# Patient Record
Sex: MALE | Race: WHITE | NOT HISPANIC OR LATINO | Employment: OTHER | URBAN - METROPOLITAN AREA
[De-identification: names, ages, dates, MRNs, and addresses within clinical notes are randomized per-mention and may not be internally consistent; named-entity substitution may affect disease eponyms.]

---

## 2017-01-26 ENCOUNTER — TELEPHONE (OUTPATIENT)
Dept: NEUROLOGY | Facility: CLINIC | Age: 67
End: 2017-01-26

## 2017-01-26 NOTE — TELEPHONE ENCOUNTER
Returned call to pt and he states Azilect $700 a month for 30 days supply. He wants to know if he can have something close to this. He also states the CD/LD/.enta is $300 a month as well. This is a 30 day supply as well. Pt is willing to have some patient assistance as well, if he can.

## 2017-01-26 NOTE — TELEPHONE ENCOUNTER
----- Message from Davida Trevizo sent at 1/24/2017  8:35 AM CST -----  Contact: pt [phone]  Pt is calling to speak with nurse regarding changing his medication.pls call

## 2017-01-31 ENCOUNTER — TELEPHONE (OUTPATIENT)
Dept: PHARMACY | Facility: CLINIC | Age: 67
End: 2017-01-31

## 2017-03-22 ENCOUNTER — OFFICE VISIT (OUTPATIENT)
Dept: NEUROLOGY | Facility: CLINIC | Age: 67
End: 2017-03-22
Payer: COMMERCIAL

## 2017-03-22 VITALS
HEIGHT: 72 IN | HEART RATE: 86 BPM | DIASTOLIC BLOOD PRESSURE: 70 MMHG | BODY MASS INDEX: 34.84 KG/M2 | SYSTOLIC BLOOD PRESSURE: 119 MMHG | WEIGHT: 257.25 LBS

## 2017-03-22 DIAGNOSIS — G20.A1 PARKINSON'S DISEASE: ICD-10-CM

## 2017-03-22 PROCEDURE — 99213 OFFICE O/P EST LOW 20 MIN: CPT | Mod: S$GLB,,, | Performed by: PSYCHIATRY & NEUROLOGY

## 2017-03-22 PROCEDURE — 99999 PR PBB SHADOW E&M-EST. PATIENT-LVL III: CPT | Mod: PBBFAC,,, | Performed by: PSYCHIATRY & NEUROLOGY

## 2017-03-22 PROCEDURE — 95970 ALYS NPGT W/O PRGRMG: CPT | Mod: S$GLB,,, | Performed by: PSYCHIATRY & NEUROLOGY

## 2017-03-22 PROCEDURE — 1160F RVW MEDS BY RX/DR IN RCRD: CPT | Mod: S$GLB,,, | Performed by: PSYCHIATRY & NEUROLOGY

## 2017-03-22 NOTE — PROGRESS NOTES
Name: Modesto Mariscal  MRN: [id number]   CSN: [id number]      Date: 03/22/2017    Chief Complaint: tremor and toes curling in evenings  - tremor is under good control in his arms B  - azilect cost jumped after went generic  As plan stopped paying for brand  - stalevo also not paid  - L arm tremor still comes out when stressed or tired  - no wearing off if takes the meds on time, unless late evening stretch for longer than 6 hours    HPI: 67 yo RH male with PD, S/P B DBS, last seen in office for programming 8-9-16. He is accompanied today by his wife. Notes tremor BH- LH>RH. He is aware of tremor daily. He leonela last program without difficulty. Wife states that toes are curling up. Began one to two months ago. Occurs when standing all day and then goes to take shoes off.  Typically doses [id number]    Meds:   Current Outpatient Prescriptions on File Prior to Visit   Medication Sig Dispense Refill    amantadine HCl (SYMMETREL) 100 mg capsule Take 100 mg by mouth 2 (two) times daily.      carbidopa-levodopa-entacapone 37.5-150-200 mg (STALEVO) 37.5-150-200 mg Tab Take 1 tablet by mouth 3 (three) times daily.      gabapentin (NEURONTIN) 100 MG capsule Take 100 mg by mouth 3 (three) times daily.      glipiZIDE (GLUCOTROL) 10 MG TR24 Take 10 mg by mouth daily with breakfast.      metformin (GLUCOPHAGE) 1000 MG tablet Take 1,000 mg by mouth daily with breakfast.       ofloxacin (OCUFLOX) 0.3 % ophthalmic solution   1    omeprazole (PRILOSEC) 20 MG capsule Take 20 mg by mouth once daily.      sitagliptin (JANUVIA) 100 MG Tab Take 100 mg by mouth once daily.      valsartan-hydrochlorothiazide (DIOVAN-HCT) 320-12.5 mg per tablet Take 1 tablet by mouth once daily.      AZILECT 1 mg Tab TAKE 1 TABLET (1 MG TOTAL) BY MOUTH ONCE DAILY. 30 tablet 11    gabapentin (NEURONTIN) 100 MG capsule TAKE 1 CAPSULE BY MOUTH 3 (THREE) TIMES DAILY.      ketoconazole (NIZORAL) 2 % cream APPLY TOPICALLY TO FACE QD AS NEEDED       prednisoLONE acetate (PRED FORTE) 1 % DrpS   1     No current facility-administered medications on file prior to visit.      Vitals:    03/22/17 1030   BP: 119/70   Pulse: 86     Time/doses of last PD meds taken: last dose at 7 am today    Initial DBS settings:     L STN  Volts  PW  Freq  Imp  Curr  Quinn  Exam/Notes   C+2- 2.0 90 180 1144 1.760 2.94 R hand tremor gone     R STN  Volts  PW  Freq  Imp  Curr  Quinn  Exam/Notes   11+10-9-8- 3.5 90 180 864 4.001  Minimal L hand tremor with distraction or as turning the DBS off/on.     Turned up the R STN to 3.6.  Well -tolerated, tremor suppressed.    Diagnoses:          PD    Plan:  1) Continue generic cd/ld and comtan and amantadine, no azilect 2/2 $$$.  2)     I spent 25 minutes face-to-face with the patient for DBS programming today.    Brad Goff MD, MPH  Division of Movement and Memory Disorders  [facility]

## 2017-03-22 NOTE — LETTER
March 28, 2017      Cuong Mock III, MD  [address] [facility] [place] [address]           [facility]  [address]  [facility] [address]  Phone: [phone]  Fax: [phone]          Patient: Modesto Mariscal   MR Number: [id number]   YOB: 1950   Date of Visit: 3/22/2017       Dear Dr. Cuong Mock III:    Thank you for referring Modesto Mariscal to me for evaluation. Attached you will find relevant portions of my assessment and plan of care.    If you have questions, please do not hesitate to call me. I look forward to following Modesto Mariscal along with you.    Sincerely,    Brad Goff MD    Enclosure  CC:  No Recipients    If you would like to receive this communication electronically, please contact [email or web address] or [phone] to request more information on [employer] Link access.    For providers and/or their staff who would like to refer a patient to [facility], please contact us through our one-stop-shop provider referral line, [facility], at [phone].    If you feel you have received this communication in error or would no longer like to receive these types of communications, please e-mail [email or web address]

## 2017-11-10 ENCOUNTER — OFFICE VISIT (OUTPATIENT)
Dept: NEUROLOGY | Facility: CLINIC | Age: 67
End: 2017-11-10
Payer: MEDICARE

## 2017-11-10 VITALS
WEIGHT: 248.25 LBS | HEIGHT: 72 IN | HEART RATE: 89 BPM | BODY MASS INDEX: 33.62 KG/M2 | DIASTOLIC BLOOD PRESSURE: 75 MMHG | SYSTOLIC BLOOD PRESSURE: 125 MMHG

## 2017-11-10 DIAGNOSIS — G20.A1 PARKINSON'S DISEASE: ICD-10-CM

## 2017-11-10 PROCEDURE — 99999 PR PBB SHADOW E&M-EST. PATIENT-LVL III: CPT | Mod: PBBFAC,,, | Performed by: PSYCHIATRY & NEUROLOGY

## 2017-11-10 PROCEDURE — 99213 OFFICE O/P EST LOW 20 MIN: CPT | Mod: PBBFAC,25 | Performed by: PSYCHIATRY & NEUROLOGY

## 2017-11-10 PROCEDURE — 95978 PR ANALYZE NEUROSTIM BRAIN, FIRST 1H: CPT | Mod: PBBFAC | Performed by: PSYCHIATRY & NEUROLOGY

## 2017-11-10 PROCEDURE — 99499 UNLISTED E&M SERVICE: CPT | Mod: S$PBB,,, | Performed by: PSYCHIATRY & NEUROLOGY

## 2017-11-10 PROCEDURE — 95978 PR ANALYZE NEUROSTIM BRAIN, FIRST 1H: CPT | Mod: S$PBB,,, | Performed by: PSYCHIATRY & NEUROLOGY

## 2017-11-10 RX ORDER — ENTACAPONE 200 MG/1
200 TABLET ORAL 3 TIMES DAILY
COMMUNITY
End: 2018-12-11 | Stop reason: SDUPTHER

## 2017-11-10 NOTE — LETTER
November 13, 2017      Cuong Mock III, MD  [address] [facility] [place] [address]           [facility]  [address]  [facility] [address]  Phone: [phone]  Fax: [phone]          Patient: Modesto Mariscal   MR Number: [id number]   YOB: 1950   Date of Visit: 11/10/2017       Dear Dr. Cuong Mock III:    Thank you for referring Modesto Mariscal to me for evaluation. Attached you will find relevant portions of my assessment and plan of care.    If you have questions, please do not hesitate to call me. I look forward to following Modesto Mariscal along with you.    Sincerely,    Erna Sherman  CC:  No Recipients    If you would like to receive this communication electronically, please contact [email or web address] or [phone] to request more information on [employer] Link access.    For providers and/or their staff who would like to refer a patient to [facility], please contact us through our one-stop-shop provider referral line, [facility], at [phone].    If you feel you have received this communication in error or would no longer like to receive these types of communications, please e-mail [email or web address]

## 2017-11-10 NOTE — PROGRESS NOTES
Name: Modesto Mariscal  MRN: [id number]   CSN: [id number]      Date: 11/10/2017    Chief Complaint:   - more persistent L hand tremor, R gets worse at other times  - some L toe curling under  - worse when is nervous or anxious, better if lying flat  - some imbalance, no falls (but once a month has a scare)      From March 2017:  - tremor is under good control in his arms B  - azilect cost jumped after went generic  As plan stopped paying for brand  - stalevo also not paid  - L arm tremor still comes out when stressed or tired  - no wearing off if takes the meds on time, unless late evening stretch for longer than 6 hours    HPI: 65 yo RH male with PD, S/P B DBS, last seen in office for programming 8-9-16. He is accompanied today by his wife. Notes tremor BH- LH>RH. He is aware of tremor daily. He leonela last program without difficulty. Wife states that toes are curling up. Began one to two months ago. Occurs when standing all day and then goes to take shoes off.  Typically doses [id number]    Meds:   Current Outpatient Prescriptions on File Prior to Visit   Medication Sig Dispense Refill    amantadine HCl (SYMMETREL) 100 mg capsule Take 100 mg by mouth 2 (two) times daily.      carbidopa-levodopa-entacapone 37.5-150-200 mg (STALEVO) 37.5-150-200 mg Tab Take 1 tablet by mouth 3 (three) times daily.      gabapentin (NEURONTIN) 100 MG capsule Take 100 mg by mouth 3 (three) times daily.      gabapentin (NEURONTIN) 100 MG capsule TAKE 1 CAPSULE BY MOUTH 3 (THREE) TIMES DAILY.      glipiZIDE (GLUCOTROL) 10 MG TR24 Take 10 mg by mouth daily with breakfast.      metformin (GLUCOPHAGE) 1000 MG tablet Take 1,000 mg by mouth daily with breakfast.       omeprazole (PRILOSEC) 20 MG capsule Take 20 mg by mouth once daily.      sitagliptin (JANUVIA) 100 MG Tab Take 100 mg by mouth once daily.      valsartan-hydrochlorothiazide (DIOVAN-HCT) 320-12.5 mg per tablet Take 1 tablet by mouth once daily.      AZILECT 1 mg Tab  TAKE 1 TABLET (1 MG TOTAL) BY MOUTH ONCE DAILY. 30 tablet 11    ketoconazole (NIZORAL) 2 % cream APPLY TOPICALLY TO FACE QD AS NEEDED      ofloxacin (OCUFLOX) 0.3 % ophthalmic solution   1    prednisoLONE acetate (PRED FORTE) 1 % DrpS   1     No current facility-administered medications on file prior to visit.      Vitals:    11/10/17 1335   BP: 125/75   Pulse: 89     Time/doses of last PD meds taken: last dose at 7 am today    Initial DBS settings:     L STN  Volts  PW  Freq  Imp  Curr  Quinn  Exam/Notes   C+2- 2.4 90 180 1044 2.299 2.90 R hand tremor gone     R STN  Volts  PW  Freq  Imp  Curr  Quinn  Exam/Notes   11+10-9-8- 3.8 90 180 900 4.173  Minimal L hand tremor with distraction or as turning the DBS off/on.     Guided therapy:  L STN  Volts  PW  Freq  Imp  Curr  Quinn  Exam/Notes   C+2- 2.6 90 180   2.90 R hand tremor gone     R STN  Volts  PW  Freq  Imp  Curr  Quinn  Exam/Notes   C+10- 0.5 90 180    50% improvement    1.0 90 180    50% improvement    1.5 90 180    75% improvement    2.0 90 180    Pulling of l face, still shaking             C+9-10- 1.0 90 180    Tremor about 50%          Pulling on L    11-10-9-8+ Up to 3.5   982 3.529  75% better    4.0 90 180 961 4.130                4.0 90 180 958 4.087               C+10-11- 2.0 60 180 663 2.963                                     Final (setting C):    L STN  Volts  PW  Freq  Imp  Curr  Quinn  Exam/Notes   C+2- 2.6 90 180 1044 2.457 2.90 R hand tremor gone     R STN  Volts  PW  Freq  Imp  Curr  Quinn  Exam/Notes   C+10-11 1.0 60 180 667 1.455  75% improvement   C+9-8- 2.0 60 180 711 2.751  75% improvement   * interleaving      Diagnoses:          PD    Plan:  I spent 45 minutes face-to-face with the patient for DBS programming today.    Set A, B, C settings for his ability to toggle between.    Brad Goff MD, MPH  Division of Movement and Memory Disorders  [facility]

## 2018-02-19 ENCOUNTER — OFFICE VISIT (OUTPATIENT)
Dept: NEUROLOGY | Facility: CLINIC | Age: 68
End: 2018-02-19
Payer: MEDICARE

## 2018-02-19 VITALS
SYSTOLIC BLOOD PRESSURE: 142 MMHG | BODY MASS INDEX: 33.41 KG/M2 | DIASTOLIC BLOOD PRESSURE: 72 MMHG | WEIGHT: 246.69 LBS | HEART RATE: 90 BPM | HEIGHT: 72 IN

## 2018-02-19 DIAGNOSIS — G20.A1 PARKINSON DISEASE: ICD-10-CM

## 2018-02-19 DIAGNOSIS — Z46.2 ENCOUNTER FOR INTERROGATION OF NEUROSTIMULATOR: Primary | ICD-10-CM

## 2018-02-19 PROCEDURE — 99999 PR PBB SHADOW E&M-EST. PATIENT-LVL III: CPT | Mod: PBBFAC,,, | Performed by: PSYCHIATRY & NEUROLOGY

## 2018-02-19 PROCEDURE — 99499 UNLISTED E&M SERVICE: CPT | Mod: S$PBB,,, | Performed by: PSYCHIATRY & NEUROLOGY

## 2018-02-19 PROCEDURE — 99213 OFFICE O/P EST LOW 20 MIN: CPT | Mod: PBBFAC | Performed by: PSYCHIATRY & NEUROLOGY

## 2018-02-19 PROCEDURE — 95978 PR ANALYZE NEUROSTIM BRAIN, FIRST 1H: CPT | Mod: PBBFAC | Performed by: PSYCHIATRY & NEUROLOGY

## 2018-02-19 PROCEDURE — 95978 PR ANALYZE NEUROSTIM BRAIN, FIRST 1H: CPT | Mod: S$PBB,,, | Performed by: PSYCHIATRY & NEUROLOGY

## 2018-02-19 RX ORDER — APIXABAN 5 MG/1
5 TABLET, FILM COATED ORAL 2 TIMES DAILY
COMMUNITY
Start: 2018-02-13

## 2018-02-19 NOTE — PROGRESS NOTES
Name: Modesto Mariscal  MRN: [id number]   CSN: [id number]      Date: 02/19/2018    Chief Complaint:   - good DBS programming last time FOR ABOUT A WEEK  - but has been moving around less and prothrombin gene mutation --> Had DVT in R leg and now on eliquis  - no falls, but some stutter steps  - no issues with cognition  -  COMES IN AND HAS BEEN OFF SINCE November 19TH!     From Nov 2017:  - more persistent L hand tremor, R gets worse at other times  - some L toe curling under  - worse when is nervous or anxious, better if lying flat  - some imbalance, no falls (but once a month has a scare)    From March 2017:  - tremor is under good control in his arms B  - azilect cost jumped after went generic  As plan stopped paying for brand  - stalevo also not paid  - L arm tremor still comes out when stressed or tired  - no wearing off if takes the meds on time, unless late evening stretch for longer than 6 hours    HPI: 67 yo RH male with PD, S/P B DBS, last seen in office for programming 8-9-16. He is accompanied today by his wife. Notes tremor BH- LH>RH. He is aware of tremor daily. He leonela last program without difficulty. Wife states that toes are curling up. Began one to two months ago. Occurs when standing all day and then goes to take shoes off.  Typically doses [id number]    Meds:   Current Outpatient Prescriptions on File Prior to Visit   Medication Sig Dispense Refill    amantadine HCl (SYMMETREL) 100 mg capsule Take 100 mg by mouth 2 (two) times daily.      AZILECT 1 mg Tab TAKE 1 TABLET (1 MG TOTAL) BY MOUTH ONCE DAILY. 30 tablet 11    carbidopa-levodopa-entacapone 37.5-150-200 mg (STALEVO) 37.5-150-200 mg Tab Take 1 tablet by mouth 3 (three) times daily.      entacapone (COMTAN) 200 mg Tab Take 200 mg by mouth 3 (three) times daily.      gabapentin (NEURONTIN) 100 MG capsule Take 100 mg by mouth 3 (three) times daily.      gabapentin (NEURONTIN) 100 MG capsule TAKE 1 CAPSULE BY MOUTH 3 (THREE) TIMES  DAILY.      glipiZIDE (GLUCOTROL) 10 MG TR24 Take 10 mg by mouth daily with breakfast.      ketoconazole (NIZORAL) 2 % cream APPLY TOPICALLY TO FACE QD AS NEEDED      metformin (GLUCOPHAGE) 1000 MG tablet Take 1,000 mg by mouth daily with breakfast.       omeprazole (PRILOSEC) 20 MG capsule Take 20 mg by mouth once daily.      sitagliptin (JANUVIA) 100 MG Tab Take 100 mg by mouth once daily.      valsartan-hydrochlorothiazide (DIOVAN-HCT) 320-12.5 mg per tablet Take 1 tablet by mouth once daily.      ofloxacin (OCUFLOX) 0.3 % ophthalmic solution   1    prednisoLONE acetate (PRED FORTE) 1 % DrpS   1     No current facility-administered medications on file prior to visit.      Vitals:    02/19/18 1535   BP: (!) 142/72   Pulse: 90     Time/doses of last PD meds taken: last dose at 7 am today    Initial DBS settings:     Starting setting - was OFF when he came to clinic:    L STN  Volts  PW  Freq  Imp  Curr  Quinn  Exam/Notes   C+2- 2.6 90 120 1044 2.457 2.90 R hand tremor gone     R STN  Volts  PW  Freq  Imp  Curr  Quinn  Exam/Notes   C+10-11 1.0 60 120 667 1.455  75% improvement   C+9-8- 2.0 60 120 711 2.751  75% improvement   * interleaving    Changed multiple settings up and down the voltage range  R STN  Volts  PW  Freq  Imp  Curr  Quinn  Exam/Notes   11+10-9-8- 0.8 60 180 706 1.123  Mild-moderate L hand tremor        Diagnoses:          PD    Plan:  - stim  - meds as he is  - exercise    Brad Goff MD, MPH  Division of Movement and Memory Disorders  [facility]

## 2018-04-09 ENCOUNTER — TELEPHONE (OUTPATIENT)
Dept: NEUROLOGY | Facility: CLINIC | Age: 68
End: 2018-04-09

## 2018-04-17 ENCOUNTER — OFFICE VISIT (OUTPATIENT)
Dept: NEUROLOGY | Facility: CLINIC | Age: 68
End: 2018-04-17
Payer: MEDICARE

## 2018-04-17 VITALS
HEART RATE: 81 BPM | WEIGHT: 237.19 LBS | SYSTOLIC BLOOD PRESSURE: 113 MMHG | HEIGHT: 72 IN | DIASTOLIC BLOOD PRESSURE: 77 MMHG | BODY MASS INDEX: 32.13 KG/M2

## 2018-04-17 DIAGNOSIS — Z46.2 ENCOUNTER FOR INTERROGATION OF NEUROSTIMULATOR: ICD-10-CM

## 2018-04-17 DIAGNOSIS — G20.A1 PD (PARKINSON'S DISEASE): Primary | ICD-10-CM

## 2018-04-17 PROCEDURE — 99213 OFFICE O/P EST LOW 20 MIN: CPT | Mod: PBBFAC,25 | Performed by: PSYCHIATRY & NEUROLOGY

## 2018-04-17 PROCEDURE — 99999 PR PBB SHADOW E&M-EST. PATIENT-LVL III: CPT | Mod: PBBFAC,,, | Performed by: PSYCHIATRY & NEUROLOGY

## 2018-04-17 PROCEDURE — 99499 UNLISTED E&M SERVICE: CPT | Mod: S$PBB,,, | Performed by: PSYCHIATRY & NEUROLOGY

## 2018-04-17 PROCEDURE — 95978 PR ANALYZE NEUROSTIM BRAIN, FIRST 1H: CPT | Mod: S$PBB,,, | Performed by: PSYCHIATRY & NEUROLOGY

## 2018-04-17 PROCEDURE — 95978 PR ANALYZE NEUROSTIM BRAIN, FIRST 1H: CPT | Mod: PBBFAC | Performed by: PSYCHIATRY & NEUROLOGY

## 2018-04-17 RX ORDER — CLOTRIMAZOLE AND BETAMETHASONE DIPROPIONATE 10; .64 MG/G; MG/G
CREAM TOPICAL
Refills: 2 | COMMUNITY
Start: 2018-03-29 | End: 2019-07-08

## 2018-04-17 RX ORDER — AMANTADINE HYDROCHLORIDE 100 MG/1
100 CAPSULE, GELATIN COATED ORAL
COMMUNITY
Start: 2018-03-12 | End: 2018-12-11 | Stop reason: SDUPTHER

## 2018-04-17 NOTE — PROGRESS NOTES
Name: Modesto Mariscal  MRN: [id number]   CSN: [id number]      Date: 04/17/2018    Chief Complaint:  - had 2 falls on uneven ground, chasing the dog  - balance is generally worse  - tremor on the left remains about the same  - no swallow issues, but speech remains an issue - perhaps slurring more now  - bowel and bladder stable except one episode of incontinence after last appt (and ate chinese food)  - he thinks memory and mood    From the wife and family:  - memory is worse for short term, word finding, misplacing things (usually turn up)  - lost the dbs   - mood is more ornery (maybe mostly with his wife)      From 2/19/18:   - good DBS programming last time FOR ABOUT A WEEK  - but has been moving around less and prothrombin gene mutation --> Had DVT in R leg and now on eliquis  - no falls, but some stutter steps  - no issues with cognition  -  COMES IN AND HAS BEEN OFF SINCE November 19TH!     From Nov 2017:  - more persistent L hand tremor, R gets worse at other times  - some L toe curling under  - worse when is nervous or anxious, better if lying flat  - some imbalance, no falls (but once a month has a scare)    From March 2017:  - tremor is under good control in his arms B  - azilect cost jumped after went generic  As plan stopped paying for brand  - stalevo also not paid  - L arm tremor still comes out when stressed or tired  - no wearing off if takes the meds on time, unless late evening stretch for longer than 6 hours    HPI: 67 yo RH male with PD, S/P B DBS, last seen in office for programming 8-9-16. He is accompanied today by his wife. Notes tremor BH- LH>RH. He is aware of tremor daily. He leonela last program without difficulty. Wife states that toes are curling up. Began one to two months ago. Occurs when standing all day and then goes to take shoes off.  Typically doses [id number]    Meds:   Current Outpatient Prescriptions on File Prior to Visit   Medication Sig Dispense Refill     amantadine HCl (SYMMETREL) 100 mg capsule Take 100 mg by mouth 2 (two) times daily.      AZILECT 1 mg Tab TAKE 1 TABLET (1 MG TOTAL) BY MOUTH ONCE DAILY. 30 tablet 11    carbidopa-levodopa-entacapone 37.5-150-200 mg (STALEVO) 37.5-150-200 mg Tab Take 1 tablet by mouth 3 (three) times daily.      ELIQUIS 5 mg Tab       entacapone (COMTAN) 200 mg Tab Take 200 mg by mouth 3 (three) times daily.      gabapentin (NEURONTIN) 100 MG capsule Take 100 mg by mouth 3 (three) times daily.      gabapentin (NEURONTIN) 100 MG capsule TAKE 1 CAPSULE BY MOUTH 3 (THREE) TIMES DAILY.      ketoconazole (NIZORAL) 2 % cream APPLY TOPICALLY TO FACE QD AS NEEDED      metformin (GLUCOPHAGE) 1000 MG tablet Take 1,000 mg by mouth daily with breakfast.       omeprazole (PRILOSEC) 20 MG capsule Take 20 mg by mouth once daily.      sitagliptin (JANUVIA) 100 MG Tab Take 100 mg by mouth once daily.      valsartan-hydrochlorothiazide (DIOVAN-HCT) 320-12.5 mg per tablet Take 1 tablet by mouth once daily.      glipiZIDE (GLUCOTROL) 10 MG TR24 Take 10 mg by mouth daily with breakfast.      ofloxacin (OCUFLOX) 0.3 % ophthalmic solution   1    prednisoLONE acetate (PRED FORTE) 1 % DrpS   1     No current facility-administered medications on file prior to visit.      Vitals:    04/17/18 1356   BP: 113/77   Pulse: 81     Initial DBS settings:     L STN  Volts  PW  Freq  Imp  Curr  Quinn  Exam/Notes   C+2- 2.6 90 180   2.90 R hand tremor gone     R STN  Volts  PW  Freq  Imp  Curr  Quinn  Exam/Notes   C+11-10-9- 0.8 60 180    Mild-moderate L hand tremor      Changed settings:    R STN  Volts  PW  Freq  Imp  Curr  Quinn  Exam/Notes   C+11-10- 0.8 60 180    Left off the 9 contact    Up to 2.0 60 180    Puckering mouth at 2.0              11-10-9+ Up to 3.0 90 180    Puckering mouth, then gone  Set to B             11-10-9-8+ 3.0 90 180    Set to A                                                                                                                    Diagnoses:          PD    Plan:  - stim, complicated  - meds without change  - exercise    Brad Goff MD, MPH  Division of Movement and Memory Disorders  [facility]

## 2018-07-23 ENCOUNTER — TELEPHONE (OUTPATIENT)
Dept: NEUROLOGY | Facility: CLINIC | Age: 68
End: 2018-07-23

## 2018-07-23 NOTE — TELEPHONE ENCOUNTER
----- Message from Leoncio Saba sent at 7/23/2018 12:02 PM CDT -----  Needs Advice    Reason for call: Marilou is calling to schedule DBS programming. Last seen 04/17/18.   Communication Preference: Marilou (daughter) @ [phone]  Additional Information:

## 2018-07-23 NOTE — TELEPHONE ENCOUNTER
----- Message from Bertha Astudillo sent at 7/23/2018  5:34 PM CDT -----  Contact: Pt's daughter(Marilou)   Marilou is returning a call, that the pt just missed.    Marilou can be reached at [phone].    Thank you

## 2018-08-07 ENCOUNTER — OFFICE VISIT (OUTPATIENT)
Dept: NEUROLOGY | Facility: CLINIC | Age: 68
End: 2018-08-07
Payer: MEDICARE

## 2018-08-07 VITALS
HEIGHT: 72 IN | BODY MASS INDEX: 32.25 KG/M2 | SYSTOLIC BLOOD PRESSURE: 130 MMHG | HEART RATE: 85 BPM | DIASTOLIC BLOOD PRESSURE: 76 MMHG | WEIGHT: 238.13 LBS

## 2018-08-07 DIAGNOSIS — Z46.2 ENCOUNTER FOR INTERROGATION OF NEUROSTIMULATOR: ICD-10-CM

## 2018-08-07 DIAGNOSIS — G20.A1 PARKINSON'S DISEASE: Primary | ICD-10-CM

## 2018-08-07 DIAGNOSIS — R06.83 SNORING: ICD-10-CM

## 2018-08-07 DIAGNOSIS — R26.9 GAIT DISTURBANCE: ICD-10-CM

## 2018-08-07 PROCEDURE — 95979 PR ANALYZ NEUROSTIM BRAIN, EACH ADD 30 MIN: CPT | Mod: S$PBB,,, | Performed by: NURSE PRACTITIONER

## 2018-08-07 PROCEDURE — 99213 OFFICE O/P EST LOW 20 MIN: CPT | Mod: PBBFAC,25 | Performed by: NURSE PRACTITIONER

## 2018-08-07 PROCEDURE — 95979 PR ANALYZ NEUROSTIM BRAIN, EACH ADD 30 MIN: CPT | Mod: PBBFAC | Performed by: NURSE PRACTITIONER

## 2018-08-07 PROCEDURE — 95978 PR ANALYZE NEUROSTIM BRAIN, FIRST 1H: CPT | Mod: PBBFAC | Performed by: NURSE PRACTITIONER

## 2018-08-07 PROCEDURE — 99999 PR PBB SHADOW E&M-EST. PATIENT-LVL III: CPT | Mod: PBBFAC,,, | Performed by: NURSE PRACTITIONER

## 2018-08-07 PROCEDURE — 99499 UNLISTED E&M SERVICE: CPT | Mod: S$PBB,,, | Performed by: NURSE PRACTITIONER

## 2018-08-07 PROCEDURE — 95978 PR ANALYZE NEUROSTIM BRAIN, FIRST 1H: CPT | Mod: S$PBB,,, | Performed by: NURSE PRACTITIONER

## 2018-08-07 RX ORDER — GABAPENTIN 300 MG/1
CAPSULE ORAL
Refills: 5 | COMMUNITY
Start: 2018-07-24 | End: 2019-07-08 | Stop reason: SDUPTHER

## 2018-08-07 NOTE — LETTER
August 7, 2018      Brad Goff MD  [address]  [facility] [address]           [facility]  [address]  [facility] [address]  Phone: [phone]  Fax: [phone]          Patient: Modesto Mariscal   MR Number: [id number]   YOB: 1950   Date of Visit: 8/7/2018       Dear Dr. Brad Goff:    Thank you for referring Modesto Mariscal to me for evaluation. Attached you will find relevant portions of my assessment and plan of care.    If you have questions, please do not hesitate to call me. I look forward to following Modesto Mariscal along with you.    Sincerely,    Beba Arellano NP    Enclosure  CC:  No Recipients    If you would like to receive this communication electronically, please contact [email or web address] or [phone] to request more information on [employer] Link access.    For providers and/or their staff who would like to refer a patient to [facility], please contact us through our one-stop-shop provider referral line, [facility] , at [phone].    If you feel you have received this communication in error or would no longer like to receive these types of communications, please e-mail [email or web address]

## 2018-08-07 NOTE — PATIENT INSTRUCTIONS
Recommend physical therapy to help with walking.     If you decide you want to have sleep study, let us know. We are happy to refer you.     I think your carbidopa- levodopa- entacapone doses are too far apart from 6:30 to 1:30. I recommend that you move the 1:30 dose up to noon to see if this helps tremor, stiffness, and slowness.   Leave all other doses the same.

## 2018-08-07 NOTE — PROGRESS NOTES
Name: Modesto Mariscal  MRN: [id number]   CSN: [id number]      Date: 08/07/2018    Chief Complaint: PD- DBS    HPI: 69 yo male eith PD, S/P DBS, last seen in office 4-17-18 by Dr. Irwin.   Has fallen a fe time since that time. Hetrips over things which causes fall. Hit head about 6 wweeks, no LOC.   He does not have a cane or walker. He says he would not use an assistive device.     Describes tremor LUE- will very occ note in the RUE. Tremor gets worse when he is tired.   Describes stiffness BLE.   Feels slow most of the time.   +shuffles some- son notes shuffle worse when he gets tired  +FOG nearly every time he turns around        Found his DBS personal  and ret'd loaner that [facility] gave him.    Son concerned he has sleep apnea.     Meds:   Current Outpatient Prescriptions on File Prior to Visit   Medication Sig Dispense Refill    amantadine HCl (SYMMETREL) 100 mg capsule Take 100 mg by mouth 2 (two) times daily.      amantadine HCl (SYMMETREL) 100 mg capsule Take 100 mg by mouth.      AZILECT 1 mg Tab TAKE 1 TABLET (1 MG TOTAL) BY MOUTH ONCE DAILY. 30 tablet 11    carbidopa-levodopa-entacapone 37.5-150-200 mg (STALEVO) 37.5-150-200 mg Tab Take 1 tablet by mouth 3 (three) times daily.      ELIQUIS 5 mg Tab       entacapone (COMTAN) 200 mg Tab Take 200 mg by mouth 3 (three) times daily.      glipiZIDE (GLUCOTROL) 10 MG TR24 Take 10 mg by mouth daily with breakfast.      metformin (GLUCOPHAGE) 1000 MG tablet Take 1,000 mg by mouth daily with breakfast.       omeprazole (PRILOSEC) 20 MG capsule Take 20 mg by mouth once daily.      sitagliptin (JANUVIA) 100 MG Tab Take 100 mg by mouth once daily.      valsartan-hydrochlorothiazide (DIOVAN-HCT) 320-12.5 mg per tablet Take 1 tablet by mouth once daily.      clotrimazole-betamethasone 1-0.05% (LOTRISONE) cream   2    gabapentin (NEURONTIN) 100 MG capsule Take 100 mg by mouth 3 (three) times daily.      gabapentin (NEURONTIN) 100 MG capsule TAKE  1 CAPSULE BY MOUTH 3 (THREE) TIMES DAILY.      ketoconazole (NIZORAL) 2 % cream APPLY TOPICALLY TO FACE QD AS NEEDED      ofloxacin (OCUFLOX) 0.3 % ophthalmic solution   1    prednisoLONE acetate (PRED FORTE) 1 % DrpS   1     No current facility-administered medications on file prior to visit.      Mild hypophonia with masked face, lips parted intermittently.   Near mild tremor RLE at rest on diversion only.  Mild and persistent rest tremor LH but near moderate tremor on diversion LH.  Very subtle postural tremor BH.   JENNA- subtle yessy RH and mild LH and mild yessy BLE (R>L).   Mild cog wheeling throughout neck and all extremities.   Pushes self to stand. (uses lift chair at home)  Slow to initiate gait. No FOG noted today.  Step short, narrow-based.   Slt stooped posture.  Reduced arm swing.   Slow paced.   Steady today.         Time/doses of last PD meds taken: 0700- exam today 1135    In Channel A (does not change channels)    Initial DBS settings: ACTIVA PC- left chest  L STN  Volts  PW  Freq  Imp  Curr  Quinn  Exam/Notes   C+2- 2.8 90 180 914 3.061 2.85      With interleaving the L STN rate is now 100.Everything else unchanged.     Initial DBS settings:   R STN  Volts  PW  Freq  Imp  Curr  Quinn  Exam/Notes   11-10-9-8+ 3.20 90 180 802 3.936 2.85      Re-programming during visit:  R STN  Volts  PW  Freq  Imp  Curr  Quinn  Exam/Notes   11-10-9-8+ 3.4 90 180       11-10-9-8+ 3.5 90 180    Tremor stopped and then ret'd. Yessy is better as is rigidity but termor mild and persistent.    3.5 90 150    No change    3.5 120 150    Feels little pulling in mouth- no change in tremor    3.5 120 100    Better did not feel he as going to freeze with turning Rigidity improving    3.5 150 100    Tremor still mild and persistent, no change at all.     3.5 180 100    Mouth pulling, no change in tremor   STN 1  11+10- 1.0 150 100    No c/o- tremor remains mild and persistent   STN 2  8+9- 2.0      same   STN 1 1.5      Looks  "like slowing and then returns no c/o   STN 2 2.5      Tremor stopped    STN 2 3.0      briefly stops tremor but returns. Does nearly stop after few minutes. Feels better than when arrived. Walking better- stride longer- "feels better"     Final DBS settings:  R STN  Volts  PW  Freq  Imp  Curr  Quinn  Exam/Notes   STN 1  11+10 1.5  1.00       STN 2  8+9- 3.0 150 100 958 3.078           Diagnoses:          1. Parkinson's disease     2. Encounter for interrogation of neurostimulator     3. Snoring     4. Gait disturbance         Plan:  1) Offered referral to sleep medicine based on son's concern about his snoring. Pt not interested.   2) He has never had PT. Encouraged.   3) Complex DBS programming today. Now using interleaving for RSTN to see if helps tremor and using lower frequency to see if can help FOG  4)Recommend taking Stalevo closer together- AM and mid-day dose are too far apart.   5) call for problems.   6)Follow up with Dr. Goff as scheduled in October.         I spent 90 minutes face-to-face with the patient for DBS programming today.    ..  ..Beba Arellano, JULIA, NP-C    "

## 2018-10-09 ENCOUNTER — OFFICE VISIT (OUTPATIENT)
Dept: NEUROLOGY | Facility: CLINIC | Age: 68
End: 2018-10-09
Payer: MEDICARE

## 2018-10-09 VITALS
SYSTOLIC BLOOD PRESSURE: 124 MMHG | HEIGHT: 72 IN | BODY MASS INDEX: 33.58 KG/M2 | WEIGHT: 247.94 LBS | DIASTOLIC BLOOD PRESSURE: 73 MMHG | HEART RATE: 83 BPM

## 2018-10-09 DIAGNOSIS — G20.A1 PARKINSON DISEASE: ICD-10-CM

## 2018-10-09 DIAGNOSIS — Z46.2 ENCOUNTER FOR INTERROGATION OF NEUROSTIMULATOR: Primary | ICD-10-CM

## 2018-10-09 PROCEDURE — 99213 OFFICE O/P EST LOW 20 MIN: CPT | Mod: PBBFAC | Performed by: PSYCHIATRY & NEUROLOGY

## 2018-10-09 PROCEDURE — 95978 PR ANALYZE NEUROSTIM BRAIN, FIRST 1H: CPT | Mod: PBBFAC | Performed by: PSYCHIATRY & NEUROLOGY

## 2018-10-09 PROCEDURE — 95978 PR ANALYZE NEUROSTIM BRAIN, FIRST 1H: CPT | Mod: S$PBB,,, | Performed by: PSYCHIATRY & NEUROLOGY

## 2018-10-09 PROCEDURE — 99499 UNLISTED E&M SERVICE: CPT | Mod: S$PBB,,, | Performed by: PSYCHIATRY & NEUROLOGY

## 2018-10-09 PROCEDURE — 99999 PR PBB SHADOW E&M-EST. PATIENT-LVL III: CPT | Mod: PBBFAC,,, | Performed by: PSYCHIATRY & NEUROLOGY

## 2018-10-09 RX ORDER — OLMESARTAN MEDOXOMIL AND HYDROCHLOROTHIAZIDE 40/12.5 40; 12.5 MG/1; MG/1
1 TABLET ORAL DAILY
Refills: 11 | COMMUNITY
Start: 2018-09-28

## 2018-10-09 RX ORDER — CARBIDOPA AND LEVODOPA 25; 100 MG/1; MG/1
TABLET ORAL
Refills: 2 | COMMUNITY
Start: 2018-09-28 | End: 2018-12-11 | Stop reason: SDUPTHER

## 2018-10-09 NOTE — PROGRESS NOTES
Name: Modesto Mariscal  MRN: [id number]   CSN: [id number]      Date: 10/09/2018    Chief Complaint:  - balance is still affected, but no falls  - R hand is good and a tremor is controlled  - L hand tremor is a little worse   - not sure what he is taking     From April 2018:  - had 2 falls on uneven ground, chasing the dog  - balance is generally worse  - tremor on the left remains about the same  - no swallow issues, but speech remains an issue - perhaps slurring more now  - bowel and bladder stable except one episode of incontinence after last appt (and ate chinese food)  - he thinks memory and mood    From the wife and family:  - memory is worse for short term, word finding, misplacing things (usually turn up)  - lost the dbs   - mood is more ornery (maybe mostly with his wife)      From 2/19/18:   - good DBS programming last time FOR ABOUT A WEEK  - but has been moving around less and prothrombin gene mutation --> Had DVT in R leg and now on eliquis  - no falls, but some stutter steps  - no issues with cognition  -  COMES IN AND HAS BEEN OFF SINCE November 19TH!     From Nov 2017:  - more persistent L hand tremor, R gets worse at other times  - some L toe curling under  - worse when is nervous or anxious, better if lying flat  - some imbalance, no falls (but once a month has a scare)    From March 2017:  - tremor is under good control in his arms B  - azilect cost jumped after went generic  As plan stopped paying for brand  - stalevo also not paid  - L arm tremor still comes out when stressed or tired  - no wearing off if takes the meds on time, unless late evening stretch for longer than 6 hours    HPI: 65 yo RH male with PD, S/P B DBS, last seen in office for programming 8-9-16. He is accompanied today by his wife. Notes tremor BH- LH>RH. He is aware of tremor daily. He leonela last program without difficulty. Wife states that toes are curling up. Began one to two months ago. Occurs when standing all  day and then goes to take shoes off.  Typically doses [id number]    Meds:   Current Outpatient Medications on File Prior to Visit   Medication Sig Dispense Refill    amantadine HCl (SYMMETREL) 100 mg capsule Take 100 mg by mouth 2 (two) times daily.      amantadine HCl (SYMMETREL) 100 mg capsule Take 100 mg by mouth.      AZILECT 1 mg Tab TAKE 1 TABLET (1 MG TOTAL) BY MOUTH ONCE DAILY. 30 tablet 11    carbidopa-levodopa  mg (SINEMET)  mg per tablet   2    carbidopa-levodopa-entacapone 37.5-150-200 mg (STALEVO) 37.5-150-200 mg Tab Take 1 tablet by mouth 3 (three) times daily.      ELIQUIS 5 mg Tab       gabapentin (NEURONTIN) 100 MG capsule Take 200 mg by mouth 2 (two) times daily.       glipiZIDE (GLUCOTROL) 10 MG TR24 Take 10 mg by mouth daily with breakfast.      metformin (GLUCOPHAGE) 1000 MG tablet Take 1,000 mg by mouth daily with breakfast.       olmesartan-hydrochlorothiazide (BENICAR HCT) 40-12.5 mg Tab   11    omeprazole (PRILOSEC) 20 MG capsule Take 20 mg by mouth once daily.      sitagliptin (JANUVIA) 100 MG Tab Take 100 mg by mouth once daily.      clotrimazole-betamethasone 1-0.05% (LOTRISONE) cream   2    entacapone (COMTAN) 200 mg Tab Take 200 mg by mouth 3 (three) times daily.      gabapentin (NEURONTIN) 100 MG capsule TAKE 1 CAPSULE BY MOUTH 3 (THREE) TIMES DAILY.      gabapentin (NEURONTIN) 300 MG capsule   5    ketoconazole (NIZORAL) 2 % cream APPLY TOPICALLY TO FACE QD AS NEEDED      ofloxacin (OCUFLOX) 0.3 % ophthalmic solution   1    prednisoLONE acetate (PRED FORTE) 1 % DrpS   1    valsartan-hydrochlorothiazide (DIOVAN-HCT) 320-12.5 mg per tablet Take 1 tablet by mouth once daily.       No current facility-administered medications on file prior to visit.      Vitals:    10/09/18 1534   BP: 124/73   Pulse: 83     Initial DBS settings:     L STN  Volts  PW  Freq  Imp  Curr  Quinn  Exam/Notes   C+2- 2.8 90 100   2.85 R hand tremor gone - NO CHANGE     R STN   Volts  PW  Freq  Imp  Curr  Quinn  Exam/Notes   11+10- 1.5 150 100    Mild-moderate L hand tremor              8+9- 3.0 150 100    NO CHANGE                         Changed settings:    R STN  Volts  PW  Freq  Imp  Curr  Quinn  Exam/Notes   11+10- 2.0          2.5          3.0      No better on higher settings                       C+10- Up to 1.5      Starting pull l face             11+10-9- Up to 3.0 150 100    Tremor 50% better    3.5      Pulling L face                                                                                     Diagnoses:          PD s/p DBS    Plan:  - stim, complicated --> MORE BIG CHANGES TODAY  - INCREASE CD/LD TO 2 TID NOW, MAY ADD BACK ENTACAPONE NEXT  - exercise AS ABLE          Brad Goff MD, MPH  Division of Movement and Memory Disorders  [facility]

## 2018-10-29 ENCOUNTER — TELEPHONE (OUTPATIENT)
Dept: NEUROLOGY | Facility: CLINIC | Age: 68
End: 2018-10-29

## 2018-10-29 NOTE — TELEPHONE ENCOUNTER
----- Message from Regina Faustin sent at 10/29/2018  9:04 AM CDT -----  Contact: Marilou Bloom (Daughter) [phone]  Rx Refill/Request     Refill Rx:      Rx Name and Strength:  carbidopa-levodopa  mg (SINEMET)  mg     Preferred Pharmacy with phone number:   [place] [facility] - [place], [place] - [address]  [address]  [facility] [address]  Phone: [phone] Fax: [phone]      Communication Preference:PHONE     Additional Information:

## 2018-10-29 NOTE — TELEPHONE ENCOUNTER
See note from Alisa. Patient has medication filled by another provider, per VALE Cuellar confirmed with pharmacy.

## 2018-12-11 ENCOUNTER — OFFICE VISIT (OUTPATIENT)
Dept: NEUROLOGY | Facility: CLINIC | Age: 68
End: 2018-12-11
Payer: MEDICARE

## 2018-12-11 VITALS
HEART RATE: 91 BPM | BODY MASS INDEX: 33.63 KG/M2 | HEIGHT: 72 IN | SYSTOLIC BLOOD PRESSURE: 132 MMHG | DIASTOLIC BLOOD PRESSURE: 77 MMHG

## 2018-12-11 DIAGNOSIS — G20.A1 PARKINSON DISEASE: Primary | ICD-10-CM

## 2018-12-11 DIAGNOSIS — Z46.2 ENCOUNTER FOR INTERROGATION OF NEUROSTIMULATOR: ICD-10-CM

## 2018-12-11 PROCEDURE — 95978 PR ANALYZE NEUROSTIM BRAIN, FIRST 1H: CPT | Mod: PBBFAC | Performed by: NURSE PRACTITIONER

## 2018-12-11 PROCEDURE — 99213 OFFICE O/P EST LOW 20 MIN: CPT | Mod: PBBFAC,25 | Performed by: NURSE PRACTITIONER

## 2018-12-11 PROCEDURE — 95978 PR ANALYZE NEUROSTIM BRAIN, FIRST 1H: CPT | Mod: S$PBB,,, | Performed by: NURSE PRACTITIONER

## 2018-12-11 PROCEDURE — 99214 OFFICE O/P EST MOD 30 MIN: CPT | Mod: S$PBB,,, | Performed by: NURSE PRACTITIONER

## 2018-12-11 PROCEDURE — 99999 PR PBB SHADOW E&M-EST. PATIENT-LVL III: CPT | Mod: PBBFAC,,, | Performed by: NURSE PRACTITIONER

## 2018-12-11 RX ORDER — LEVOFLOXACIN 500 MG/1
TABLET, FILM COATED ORAL
Refills: 0 | COMMUNITY
Start: 2018-12-07 | End: 2019-07-08

## 2018-12-11 RX ORDER — CARBIDOPA AND LEVODOPA 25; 100 MG/1; MG/1
TABLET ORAL
Qty: 630 TABLET | Refills: 3 | Status: SHIPPED | OUTPATIENT
Start: 2018-12-11 | End: 2020-01-09

## 2018-12-11 RX ORDER — ENTACAPONE 200 MG/1
TABLET ORAL
Qty: 270 TABLET | Refills: 3 | Status: SHIPPED | OUTPATIENT
Start: 2018-12-11 | End: 2019-06-11

## 2018-12-11 RX ORDER — AMANTADINE HYDROCHLORIDE 100 MG/1
100 CAPSULE, GELATIN COATED ORAL 2 TIMES DAILY
Qty: 180 CAPSULE | Refills: 3 | Status: SHIPPED | OUTPATIENT
Start: 2018-12-11 | End: 2020-02-17

## 2018-12-11 NOTE — LETTER
December 11, 2018      Cuong Mock III, MD  [address] [place] [address]           [facility]  [address]  [place] [address]  Phone: [phone]  Fax: [phone]          Patient: Modesto Mariscal   MR Number: [id number]   YOB: 1950   Date of Visit: 12/11/2018       Dear Dr. Cuong Mock III:    Thank you for referring Modesto Mariscal to me for evaluation. Attached you will find relevant portions of my assessment and plan of care.    If you have questions, please do not hesitate to call me. I look forward to following Modesto Mariscal along with you.    Sincerely,    Beba Arellano, LILIANA    Enclosure  CC:  No Recipients    If you would like to receive this communication electronically, please contact [email or web address] or [phone] to request more information on [employer] access.    For providers and/or their staff who would like to refer a patient to [facility], please contact us through our one-stop-shop provider referral line, [facility], at [phone].    If you feel you have received this communication in error or would no longer like to receive these types of communications, please e-mail [email or web address]

## 2018-12-11 NOTE — PROGRESS NOTES
"Name: Modesto Mariscal  MRN: [id number]   CSN: [id number]      Date: 12/11/2018    Chief Complaint: PD    HPI: 69 yo male with PD, S/P DBS, last seen in office by Dr. Goff 10-9-18. At that time, DBS programmed and increased cd/ld to 2 tabs TID. He has not found this beneficial. Shaking more on LH and some in RH occ.  Accompanied today by his wife.   He was taking entacapone but "they did not send it" to him and he has been out of this for a couple of months.     No stiffness.  Aware of slowness.  Balance not good. No falls. Does shuffle.    ROS:  No issues swallowing.  No constipation.  Sleep not good. Does not take anything. Feels like tremor is partly why he cannot fall asleep.   No hallucinations.         1480-7062  Cd/ld 25/100 - 2 tabs  Amantadine 100      1100  Cd/ld - 2 tab      1830  Cd/ld 2 tabs  Amantadine    Goes to bed around 2768-0410    His med list that he brings with him today does not include Azilect. He is not sure if he is taking this or not. He has a handwritten list and a typed list.   I presume he is not taking this. He says he says he ran out and was not sent any more he presumes.     Meds:   Current Outpatient Medications on File Prior to Visit   Medication Sig Dispense Refill    ELIQUIS 5 mg Tab       gabapentin (NEURONTIN) 100 MG capsule TAKE 1 CAPSULE BY MOUTH 3 (THREE) TIMES DAILY.      gabapentin (NEURONTIN) 300 MG capsule   5    glipiZIDE (GLUCOTROL) 10 MG TR24 Take 10 mg by mouth daily with breakfast.      metformin (GLUCOPHAGE) 1000 MG tablet Take 1,000 mg by mouth daily with breakfast.       olmesartan-hydrochlorothiazide (BENICAR HCT) 40-12.5 mg Tab   11    omeprazole (PRILOSEC) 20 MG capsule Take 20 mg by mouth once daily.      sitagliptin (JANUVIA) 100 MG Tab Take 100 mg by mouth once daily.      [DISCONTINUED] amantadine HCl (SYMMETREL) 100 mg capsule Take 100 mg by mouth 2 (two) times daily.      [DISCONTINUED] carbidopa-levodopa  mg (SINEMET)  mg per " tablet   2    clotrimazole-betamethasone 1-0.05% (LOTRISONE) cream   2    gabapentin (NEURONTIN) 100 MG capsule Take 200 mg by mouth 2 (two) times daily.       ketoconazole (NIZORAL) 2 % cream APPLY TOPICALLY TO FACE QD AS NEEDED      levoFLOXacin (LEVAQUIN) 500 MG tablet   0    ofloxacin (OCUFLOX) 0.3 % ophthalmic solution   1    prednisoLONE acetate (PRED FORTE) 1 % DrpS   1    valsartan-hydrochlorothiazide (DIOVAN-HCT) 320-12.5 mg per tablet Take 1 tablet by mouth once daily.      [DISCONTINUED] amantadine HCl (SYMMETREL) 100 mg capsule Take 100 mg by mouth.      [DISCONTINUED] AZILECT 1 mg Tab TAKE 1 TABLET (1 MG TOTAL) BY MOUTH ONCE DAILY. 30 tablet 11    [DISCONTINUED] carbidopa-levodopa-entacapone 37.5-150-200 mg (STALEVO) 37.5-150-200 mg Tab Take 1 tablet by mouth 3 (three) times daily.      [DISCONTINUED] entacapone (COMTAN) 200 mg Tab Take 200 mg by mouth 3 (three) times daily.       No current facility-administered medications on file prior to visit.        Time/doses of last PD meds taken: last dose 0630- exam 1145    ..III.  MOTOR EXAMINATION        Speech  2 - Monotone, slurred but understandable; moderately impaired.   Facial Expression  2 - Slight but definitely abnormal diminution of facial expression.    Tremor at Rest:      Face, lips, chin 0 - Absent.    Hands:      right 1 - Slight and infrequently present.    left 2 - Mild in amplitude and persistent. Or moderate in amplitude, but only intermittently present.    Feet:      right 0 - Absent.    left 0 - Absent.    Action or Postural Tremor of Hands      right 0 - Absent.    left 1 - Slight; present with action.   Rigidity      Neck 2 - Mild or moderate.   Upper Extremity: Right 2 - Mild or moderate.   Upper Extremity: Left 2 - Mild or moderate.   Lower Extremity: Right 2 - Mild or moderate.   Lower Extremity: Left 3 - Marked, but full range of motion easily achieved.   Finger Taps      right 1 - Mild slowing and/or reduction in  amplitude.   left 2 - Moderately impaired. Definite and early fatiguing. May have occasional arrests in movement.   Hand Movements      right 1 - Mild slowing and/or reduction in amplitude.   left 2 - Moderately impaired. Definite and early fatiguing. May have occasional arrests in movement.   Rapid Alternating Movements of Hands      right 1 - Mild slowing and/or reduction in amplitude.   left 2 +- Moderately impaired. Definite and early fatiguing. May have occasional arrests in movement.   Leg Agility      right 3 - Severely impaired. Frequent hesitation in initiating movements or arrests in ongoing movement.   left 3 - Severely impaired. Frequent hesitation in initiating movements or arrests in ongoing movement.   Arising from Chair  2 - Pushes self up from arms of seat.   Posture  1 - Not quite erect, slightly stooped posture; could be normal for older person.   Gait  2 - Walks with difficulty, but requires little or no assistance; may have some festination, short steps, or propulsion.   Postural Stability (Response to sudden, strong posterior displacement produced by pull on shoulders while patient erect with eyes open and feet slightly apart. Patient is prepared, and can have had some practice runs.)  deferred   Body Bradykinesia and Hypokinesia (Combining slowness, hesitancy, decreased armswing, small amplitude, and poverty of movement in general)  2 - Mild degree of slowness and poverty of movement which is definitely abnormal.         Initial DBS settings:   L STN  Volts  PW  Freq  Imp  Curr  Quinn  Exam/Notes   C+2- 2.8 90 100 851 3.271 2.8      Re-programming during visit:  L STN  Volts  PW  Freq  Imp  Curr  Quinn  Exam/Notes   C+2- 3.0 90 100                                       Final DBS settings:  L STN  Volts  PW  Freq  Imp  Curr  Quinn  Exam/Notes   C+2- 3.0 90 100 849 3.508         ____________________________________________________________________________    Initial DBS settings:   R STN 1  Volts   PW  Freq  Imp  Curr  Quinn  Exam/Notes   11+10-9- 3.0 150 100 989 3.271 2.8      Initial DBS settings:   R STN 2  Volts  PW  Freq  Imp  Curr  Quinn  Exam/Notes   9-8+ 3.0  2.561 2.8          Re-programming during visit:  R STN 1  Volts  PW  Freq  Imp  Curr  Quinn  Exam/Notes   11+10-9- 3.4 150 100    No change    3.0 180     No change    3.0 150 120                                                 Re-programming during visit:  R STN 2  Volts  PW  Freq  Imp  Curr  Quinn  Exam/Notes   9-8+ 3.4 150 100    No change                                   NO changes were ultimately made to the R STN as increased programming appeared to worsen tremor.         Final DBS settings:  R STN  Volts  PW  Freq  Imp  Curr  Quinn  Exam/Notes   C+1-                  Diagnoses:          Parkinson's disease   Encounter for neurtostimulation    Plan:  1) NEEDS TO KNOW WHAT HE IS TAKING or at least have an updated med list.   2) DBS programmed today. Will need to re-map R brain. Scheduled for February.   3) Add back entacapone. Doses are too far apart.   4) New dose schedule again.    Follow up in Feb 2019.     I spent 40 minutes face-to-face with the patient for DBS programming today and 20 min in follow up with more than 50% time in counseling with them about PD, drug levels. PT.    ..  ..Beba Arellano, JULIA, NP-C

## 2018-12-11 NOTE — PATIENT INSTRUCTIONS
Please dose as follows...    6:30 AM  Carbidopa / levodopa 25/100- 2 tablets  Entacapone 200 mg- 1 tablet  Amantadine 100 mg    11 AM  Carbidopa / levodopa 25/100- 2 tablets  Entacapone 200 mg      3:30 PM  Carbidopa/ levodopa 25/100- 2 tablets  Entacapone 200 mg  Amantadine 100 mg    7PM  Carbidopa/ levodopa 25/100- 1 tablet    Bedtime-  Take melatonin 3 mg for sleep  (you can buy this over the counter without a Rx)

## 2019-01-16 ENCOUNTER — TELEPHONE (OUTPATIENT)
Dept: NEUROLOGY | Facility: CLINIC | Age: 69
End: 2019-01-16

## 2019-01-16 NOTE — TELEPHONE ENCOUNTER
----- Message from Meme Bragg sent at 1/16/2019 12:16 PM CST -----  Contact: Marilou (dtbenson) @ [phone]  Calling to speak with someone regarding the patients appt for physical therapy with an external facility. Please call.

## 2019-04-07 NOTE — TELEPHONE ENCOUNTER
Unable to reach pt, his wife and daughter, but I was finally able to contact his son-in-law  josesito Bloom whom I left a message with informing him to relay a message to the pt himself. I mentioned to Josesito the pt apt is cancelled for tomorrow and r/s to 4/17 at 1:40 p.m. asked josesito to have pt call me whenever he can.   3

## 2019-04-23 ENCOUNTER — TELEPHONE (OUTPATIENT)
Dept: NEUROLOGY | Facility: CLINIC | Age: 69
End: 2019-04-23

## 2019-04-23 NOTE — TELEPHONE ENCOUNTER
----- Message from Renetta Santos sent at 4/23/2019  2:17 PM CDT -----  Contact: Ml (wife): [phone]  Needs Advice    Reason for call: pt's wife would like for the pt to be seen sooner than 7/1, states the pt has fallen and would like for him to be checked out sooner by Dr. Goff         Communication Preference: Ml (wife): [phone]

## 2019-06-11 ENCOUNTER — OFFICE VISIT (OUTPATIENT)
Dept: NEUROLOGY | Facility: CLINIC | Age: 69
End: 2019-06-11
Payer: MEDICARE

## 2019-06-11 VITALS
WEIGHT: 217.81 LBS | HEIGHT: 72 IN | HEART RATE: 107 BPM | DIASTOLIC BLOOD PRESSURE: 66 MMHG | SYSTOLIC BLOOD PRESSURE: 100 MMHG | BODY MASS INDEX: 29.5 KG/M2

## 2019-06-11 DIAGNOSIS — G20.A1 PARKINSON DISEASE: Primary | ICD-10-CM

## 2019-06-11 PROCEDURE — 99214 OFFICE O/P EST MOD 30 MIN: CPT | Mod: S$PBB,,, | Performed by: PSYCHIATRY & NEUROLOGY

## 2019-06-11 PROCEDURE — 99999 PR PBB SHADOW E&M-EST. PATIENT-LVL III: CPT | Mod: PBBFAC,,, | Performed by: PSYCHIATRY & NEUROLOGY

## 2019-06-11 PROCEDURE — 99999 PR PBB SHADOW E&M-EST. PATIENT-LVL III: ICD-10-PCS | Mod: PBBFAC,,, | Performed by: PSYCHIATRY & NEUROLOGY

## 2019-06-11 PROCEDURE — 99214 PR OFFICE/OUTPT VISIT, EST, LEVL IV, 30-39 MIN: ICD-10-PCS | Mod: S$PBB,,, | Performed by: PSYCHIATRY & NEUROLOGY

## 2019-06-11 PROCEDURE — 99213 OFFICE O/P EST LOW 20 MIN: CPT | Mod: PBBFAC | Performed by: PSYCHIATRY & NEUROLOGY

## 2019-06-11 RX ORDER — TIZANIDINE 4 MG/1
TABLET ORAL
Refills: 2 | COMMUNITY
Start: 2019-03-25 | End: 2019-07-08

## 2019-06-11 RX ORDER — KETOROLAC TROMETHAMINE 10 MG/1
TABLET, FILM COATED ORAL
Refills: 0 | COMMUNITY
Start: 2019-05-08 | End: 2019-07-08

## 2019-06-11 RX ORDER — HYDROCODONE BITARTRATE AND ACETAMINOPHEN 5; 325 MG/1; MG/1
TABLET ORAL
Refills: 0 | COMMUNITY
Start: 2019-04-19 | End: 2019-07-08

## 2019-06-11 NOTE — PROGRESS NOTES
Name: Modesto Mariscal  MRN: [id number]   CSN: [id number]      Date: 06/11/2019    Chief Complaint:  - daily hiccups, comes and goes  - mouth is opening and tongue has involuntary movements  - losing weight, now down to 217 - not eating a lot now - wife is worried (his BMI is now 29)  - speech is higher and more raspy, he thinks it is a sinus drip  - when they last saw sergio, tyrese Anderson     From Oct 2018:  - balance is still affected, but no falls  - R hand is good and a tremor is controlled  - L hand tremor is a little worse   - not sure what he is taking     From April 2018:  - had 2 falls on uneven ground, chasing the dog  - balance is generally worse  - tremor on the left remains about the same  - no swallow issues, but speech remains an issue - perhaps slurring more now  - bowel and bladder stable except one episode of incontinence after last appt (and ate chinese food)  - he thinks memory and mood    From the wife and family:  - memory is worse for short term, word finding, misplacing things (usually turn up)  - lost the dbs   - mood is more ornery (maybe mostly with his wife)      From 2/19/18:   - good DBS programming last time FOR ABOUT A WEEK  - but has been moving around less and prothrombin gene mutation --> Had DVT in R leg and now on eliquis  - no falls, but some stutter steps  - no issues with cognition  -  COMES IN AND HAS BEEN OFF SINCE November 19TH!     From Nov 2017:  - more persistent L hand tremor, R gets worse at other times  - some L toe curling under  - worse when is nervous or anxious, better if lying flat  - some imbalance, no falls (but once a month has a scare)    From March 2017:  - tremor is under good control in his arms B  - azilect cost jumped after went generic  As plan stopped paying for brand  - stalevo also not paid  - L arm tremor still comes out when stressed or tired  - no wearing off if takes the meds on time, unless late evening stretch for longer than 6  hours    HPI: 67 yo RH male with PD, S/P B DBS, last seen in office for programming 8-9-16. He is accompanied today by his wife. Notes tremor BH- LH>RH. He is aware of tremor daily. He leonela last program without difficulty. Wife states that toes are curling up. Began one to two months ago. Occurs when standing all day and then goes to take shoes off.  Typically doses [id number]    Meds:   Current Outpatient Medications on File Prior to Visit   Medication Sig Dispense Refill    amantadine HCl (SYMMETREL) 100 mg capsule Take 1 capsule (100 mg total) by mouth 2 (two) times daily. 180 capsule 3    carbidopa-levodopa  mg (SINEMET)  mg per tablet Take 2 tablets 6:30 AM, 11AM, 3:30 PM. Take 1 tablet at 7PM. 630 tablet 3    clotrimazole-betamethasone 1-0.05% (LOTRISONE) cream   2    ELIQUIS 5 mg Tab       gabapentin (NEURONTIN) 100 MG capsule TAKE 1 CAPSULE BY MOUTH 3 (THREE) TIMES DAILY.      ketorolac (TORADOL) 10 mg tablet   0    levoFLOXacin (LEVAQUIN) 500 MG tablet   0    metformin (GLUCOPHAGE) 1000 MG tablet Take 1,000 mg by mouth daily with breakfast.       olmesartan-hydrochlorothiazide (BENICAR HCT) 40-12.5 mg Tab   11    omeprazole (PRILOSEC) 20 MG capsule Take 20 mg by mouth once daily.      entacapone (COMTAN) 200 mg Tab Take 1 tablet at 6:30 AM, 11 AM, and 3:30 PM with carbidopa / levodopa 270 tablet 3    gabapentin (NEURONTIN) 100 MG capsule Take 200 mg by mouth 2 (two) times daily.       gabapentin (NEURONTIN) 300 MG capsule   5    glipiZIDE (GLUCOTROL) 10 MG TR24 Take 10 mg by mouth daily with breakfast.      HYDROcodone-acetaminophen (NORCO) 5-325 mg per tablet   0    ketoconazole (NIZORAL) 2 % cream APPLY TOPICALLY TO FACE QD AS NEEDED      ofloxacin (OCUFLOX) 0.3 % ophthalmic solution   1    prednisoLONE acetate (PRED FORTE) 1 % DrpS   1    sitagliptin (JANUVIA) 100 MG Tab Take 100 mg by mouth once daily.      tiZANidine (ZANAFLEX) 4 MG tablet   2     valsartan-hydrochlorothiazide (DIOVAN-HCT) 320-12.5 mg per tablet Take 1 tablet by mouth once daily.       No current facility-administered medications on file prior to visit.      Vitals:    06/11/19 1551   BP: 100/66   Pulse: 107       /66   Pulse 107   Ht 6' (1.829 m)   Wt 98.8 kg (217 lb 13 oz)   BMI 29.54 kg/m²     Initial DBS settings:     L STN  Volts  PW  Freq  Imp  Curr  Quinn  Exam/Notes   C+2- 2.8 90 100   2.54 R hand tremor gone - NO CHANGE     R STN  Volts  PW  Freq  Imp  Curr  Quinn  Exam/Notes   11+10- 1.5 150 100    Mild-moderate L hand tremor              8+9- 3.0 150 100    NO CHANGE                         Changed settings:    R STN  Volts  PW  Freq  Imp  Curr  Quinn  Exam/Notes   11+10- 2.0          2.5          3.0      No better on higher settings                       C+10- Up to 1.5      Starting pull l face             11+10-9- Up to 3.0 150 100    Tremor 50% better    3.5      Pulling L face                                                                                     Diagnoses:          PD s/p DBS    Plan:  - stim, complicated --> MORE BIG CHANGES TODAY  - INCREASE CD/LD TO 2 TID NOW, MAY ADD BACK ENTACAPONE NEXT  - exercise AS ABLE          Brad Goff MD, MPH  Division of Movement and Memory Disorders  [facility]

## 2019-06-26 ENCOUNTER — TELEPHONE (OUTPATIENT)
Dept: NEUROLOGY | Facility: CLINIC | Age: 69
End: 2019-06-26

## 2019-06-26 NOTE — TELEPHONE ENCOUNTER
----- Message from Rosalva Toney sent at 6/25/2019  3:26 PM CDT -----  Contact: Ying (wife) @ [phone] if no answer pls leave a voice mail  Pts wife would like to speak with Dr Goff to discuss what needs to be done to get the battery for pts DBS changed.  Pls call.

## 2019-06-27 DIAGNOSIS — G20.A1 PARKINSON DISEASE: Primary | ICD-10-CM

## 2019-06-28 ENCOUNTER — TELEPHONE (OUTPATIENT)
Dept: NEUROSURGERY | Facility: CLINIC | Age: 69
End: 2019-06-28

## 2019-06-28 DIAGNOSIS — G20.A1 PARKINSON'S DISEASE: Primary | ICD-10-CM

## 2019-06-28 DIAGNOSIS — T85.9XXA COMPLICATIONS DUE TO NERVOUS SYSTEM DEVICE, IMPLANT, AND GRAFT, INITIAL ENCOUNTER: ICD-10-CM

## 2019-07-08 NOTE — PRE-PROCEDURE INSTRUCTIONS
Preop instructions: NPO solids/ milk products after midnight and clears up to 7am (clear liquids are: water, sugar free Gatorade & Jell-O, black coffee/no milk, cream or creamer), shower instructions, directions, leave all valuables at home, wear comfortable clothes, medication instructions for PM prior & am of procedure explained. Patient stated an understanding.     Patient denies any side effects or issues with anesthesia or sedation.

## 2019-07-09 ENCOUNTER — ANESTHESIA EVENT (OUTPATIENT)
Dept: SURGERY | Facility: HOSPITAL | Age: 69
End: 2019-07-09
Payer: MEDICARE

## 2019-07-09 ENCOUNTER — ANESTHESIA (OUTPATIENT)
Dept: SURGERY | Facility: HOSPITAL | Age: 69
End: 2019-07-09
Payer: MEDICARE

## 2019-07-09 ENCOUNTER — HOSPITAL ENCOUNTER (OUTPATIENT)
Facility: HOSPITAL | Age: 69
Discharge: HOME | End: 2019-07-09
Attending: NEUROLOGICAL SURGERY | Admitting: NEUROLOGICAL SURGERY
Payer: MEDICARE

## 2019-07-09 VITALS
BODY MASS INDEX: 29.26 KG/M2 | SYSTOLIC BLOOD PRESSURE: 140 MMHG | OXYGEN SATURATION: 98 % | WEIGHT: 216 LBS | HEIGHT: 72 IN | DIASTOLIC BLOOD PRESSURE: 60 MMHG | HEART RATE: 72 BPM | TEMPERATURE: 98 F | RESPIRATION RATE: 18 BRPM

## 2019-07-09 DIAGNOSIS — G20.A1 PARKINSON'S DISEASE: ICD-10-CM

## 2019-07-09 LAB
INR PPP: 1 (ref 0.8–1.2)
POCT GLUCOSE: 107 MG/DL (ref 70–110)
POCT GLUCOSE: 120 MG/DL (ref 70–110)
PROTHROMBIN TIME: 10.5 SEC (ref 9–12.5)

## 2019-07-09 PROCEDURE — 63600175 PHARM REV CODE 636 W HCPCS: Performed by: STUDENT IN AN ORGANIZED HEALTH CARE EDUCATION/TRAINING PROGRAM

## 2019-07-09 PROCEDURE — 71000016 HC POSTOP RECOV ADDL HR: Performed by: NEUROLOGICAL SURGERY

## 2019-07-09 PROCEDURE — D9220A PRA ANESTHESIA: ICD-10-PCS | Mod: CRNA,,, | Performed by: NURSE ANESTHETIST, CERTIFIED REGISTERED

## 2019-07-09 PROCEDURE — 37000009 HC ANESTHESIA EA ADD 15 MINS: Performed by: NEUROLOGICAL SURGERY

## 2019-07-09 PROCEDURE — 71000044 HC DOSC ROUTINE RECOVERY FIRST HOUR: Performed by: NEUROLOGICAL SURGERY

## 2019-07-09 PROCEDURE — 37000008 HC ANESTHESIA 1ST 15 MINUTES: Performed by: NEUROLOGICAL SURGERY

## 2019-07-09 PROCEDURE — 82962 GLUCOSE BLOOD TEST: CPT | Performed by: NEUROLOGICAL SURGERY

## 2019-07-09 PROCEDURE — 25000003 PHARM REV CODE 250: Performed by: NURSE ANESTHETIST, CERTIFIED REGISTERED

## 2019-07-09 PROCEDURE — S0028 INJECTION, FAMOTIDINE, 20 MG: HCPCS | Performed by: NURSE ANESTHETIST, CERTIFIED REGISTERED

## 2019-07-09 PROCEDURE — C1767 GENERATOR, NEURO NON-RECHARG: HCPCS | Performed by: NEUROLOGICAL SURGERY

## 2019-07-09 PROCEDURE — 63600175 PHARM REV CODE 636 W HCPCS: Performed by: NURSE ANESTHETIST, CERTIFIED REGISTERED

## 2019-07-09 PROCEDURE — 82962 GLUCOSE BLOOD TEST: CPT | Mod: 91 | Performed by: NEUROLOGICAL SURGERY

## 2019-07-09 PROCEDURE — 36000711: Performed by: NEUROLOGICAL SURGERY

## 2019-07-09 PROCEDURE — C1787 PATIENT PROGR, NEUROSTIM: HCPCS | Performed by: NEUROLOGICAL SURGERY

## 2019-07-09 PROCEDURE — D9220A PRA ANESTHESIA: Mod: ANES,,, | Performed by: ANESTHESIOLOGY

## 2019-07-09 PROCEDURE — 61886 IMPLANT NEUROSTIM ARRAYS: CPT | Mod: ,,, | Performed by: NEUROLOGICAL SURGERY

## 2019-07-09 PROCEDURE — D9220A PRA ANESTHESIA: Mod: CRNA,,, | Performed by: NURSE ANESTHETIST, CERTIFIED REGISTERED

## 2019-07-09 PROCEDURE — 61886 PR IMP STIM,CRANIAL,SUBQ,>1 ARRAY: ICD-10-PCS | Mod: ,,, | Performed by: NEUROLOGICAL SURGERY

## 2019-07-09 PROCEDURE — 85610 PROTHROMBIN TIME: CPT

## 2019-07-09 PROCEDURE — 71000015 HC POSTOP RECOV 1ST HR: Performed by: NEUROLOGICAL SURGERY

## 2019-07-09 PROCEDURE — 25000003 PHARM REV CODE 250: Performed by: STUDENT IN AN ORGANIZED HEALTH CARE EDUCATION/TRAINING PROGRAM

## 2019-07-09 PROCEDURE — 25000003 PHARM REV CODE 250: Performed by: NEUROLOGICAL SURGERY

## 2019-07-09 PROCEDURE — D9220A PRA ANESTHESIA: ICD-10-PCS | Mod: ANES,,, | Performed by: ANESTHESIOLOGY

## 2019-07-09 PROCEDURE — 36000710: Performed by: NEUROLOGICAL SURGERY

## 2019-07-09 DEVICE — NEUROSTIMULATOR IMPL MULTI ACT: Type: IMPLANTABLE DEVICE | Site: CHEST | Status: FUNCTIONAL

## 2019-07-09 RX ORDER — OXYCODONE AND ACETAMINOPHEN 5; 325 MG/1; MG/1
1 TABLET ORAL EVERY 4 HOURS PRN
Status: DISCONTINUED | OUTPATIENT
Start: 2019-07-09 | End: 2019-07-09 | Stop reason: HOSPADM

## 2019-07-09 RX ORDER — LORAZEPAM 2 MG/ML
0.25 INJECTION INTRAMUSCULAR ONCE AS NEEDED
Status: DISCONTINUED | OUTPATIENT
Start: 2019-07-09 | End: 2019-07-09 | Stop reason: HOSPADM

## 2019-07-09 RX ORDER — MUPIROCIN 20 MG/G
OINTMENT TOPICAL
Status: DISCONTINUED | OUTPATIENT
Start: 2019-07-09 | End: 2019-07-09 | Stop reason: HOSPADM

## 2019-07-09 RX ORDER — SODIUM CHLORIDE 9 MG/ML
INJECTION, SOLUTION INTRAVENOUS CONTINUOUS PRN
Status: DISCONTINUED | OUTPATIENT
Start: 2019-07-09 | End: 2019-07-09

## 2019-07-09 RX ORDER — MUPIROCIN 20 MG/G
1 OINTMENT TOPICAL
Status: COMPLETED | OUTPATIENT
Start: 2019-07-09 | End: 2019-07-09

## 2019-07-09 RX ORDER — LIDOCAINE HYDROCHLORIDE AND EPINEPHRINE 10; 10 MG/ML; UG/ML
INJECTION, SOLUTION INFILTRATION; PERINEURAL
Status: DISCONTINUED | OUTPATIENT
Start: 2019-07-09 | End: 2019-07-09 | Stop reason: HOSPADM

## 2019-07-09 RX ORDER — PROPOFOL 10 MG/ML
VIAL (ML) INTRAVENOUS CONTINUOUS PRN
Status: DISCONTINUED | OUTPATIENT
Start: 2019-07-09 | End: 2019-07-09

## 2019-07-09 RX ORDER — MIDAZOLAM HYDROCHLORIDE 1 MG/ML
INJECTION, SOLUTION INTRAMUSCULAR; INTRAVENOUS
Status: DISCONTINUED | OUTPATIENT
Start: 2019-07-09 | End: 2019-07-09

## 2019-07-09 RX ORDER — BUPIVACAINE HYDROCHLORIDE AND EPINEPHRINE 5; 5 MG/ML; UG/ML
INJECTION, SOLUTION EPIDURAL; INTRACAUDAL; PERINEURAL
Status: DISCONTINUED | OUTPATIENT
Start: 2019-07-09 | End: 2019-07-09 | Stop reason: HOSPADM

## 2019-07-09 RX ORDER — FAMOTIDINE 10 MG/ML
INJECTION INTRAVENOUS
Status: DISCONTINUED | OUTPATIENT
Start: 2019-07-09 | End: 2019-07-09

## 2019-07-09 RX ORDER — HYDROMORPHONE HYDROCHLORIDE 1 MG/ML
0.2 INJECTION, SOLUTION INTRAMUSCULAR; INTRAVENOUS; SUBCUTANEOUS EVERY 5 MIN PRN
Status: DISCONTINUED | OUTPATIENT
Start: 2019-07-09 | End: 2019-07-09 | Stop reason: HOSPADM

## 2019-07-09 RX ORDER — GLYCOPYRROLATE 0.2 MG/ML
INJECTION INTRAMUSCULAR; INTRAVENOUS
Status: DISCONTINUED | OUTPATIENT
Start: 2019-07-09 | End: 2019-07-09

## 2019-07-09 RX ORDER — ONDANSETRON 2 MG/ML
INJECTION INTRAMUSCULAR; INTRAVENOUS
Status: DISCONTINUED | OUTPATIENT
Start: 2019-07-09 | End: 2019-07-09

## 2019-07-09 RX ORDER — PROPOFOL 10 MG/ML
VIAL (ML) INTRAVENOUS
Status: DISCONTINUED | OUTPATIENT
Start: 2019-07-09 | End: 2019-07-09

## 2019-07-09 RX ORDER — FENTANYL CITRATE 50 UG/ML
INJECTION, SOLUTION INTRAMUSCULAR; INTRAVENOUS
Status: DISCONTINUED | OUTPATIENT
Start: 2019-07-09 | End: 2019-07-09

## 2019-07-09 RX ORDER — BACITRACIN 50000 [IU]/1
INJECTION, POWDER, FOR SOLUTION INTRAMUSCULAR
Status: DISCONTINUED | OUTPATIENT
Start: 2019-07-09 | End: 2019-07-09 | Stop reason: HOSPADM

## 2019-07-09 RX ORDER — PHENYLEPHRINE HYDROCHLORIDE 10 MG/ML
INJECTION INTRAVENOUS
Status: DISCONTINUED | OUTPATIENT
Start: 2019-07-09 | End: 2019-07-09

## 2019-07-09 RX ORDER — OXYCODONE AND ACETAMINOPHEN 5; 325 MG/1; MG/1
1 TABLET ORAL EVERY 4 HOURS PRN
Qty: 10 TABLET | Refills: 0 | Status: SHIPPED | OUTPATIENT
Start: 2019-07-09

## 2019-07-09 RX ORDER — SODIUM CHLORIDE 9 MG/ML
INJECTION, SOLUTION INTRAVENOUS CONTINUOUS
Status: DISCONTINUED | OUTPATIENT
Start: 2019-07-09 | End: 2019-07-09 | Stop reason: HOSPADM

## 2019-07-09 RX ORDER — SODIUM CHLORIDE 0.9 % (FLUSH) 0.9 %
3 SYRINGE (ML) INJECTION
Status: DISCONTINUED | OUTPATIENT
Start: 2019-07-09 | End: 2019-07-09 | Stop reason: HOSPADM

## 2019-07-09 RX ORDER — LIDOCAINE HCL/PF 100 MG/5ML
SYRINGE (ML) INTRAVENOUS
Status: DISCONTINUED | OUTPATIENT
Start: 2019-07-09 | End: 2019-07-09

## 2019-07-09 RX ORDER — METOCLOPRAMIDE HYDROCHLORIDE 5 MG/ML
10 INJECTION INTRAMUSCULAR; INTRAVENOUS EVERY 10 MIN PRN
Status: DISCONTINUED | OUTPATIENT
Start: 2019-07-09 | End: 2019-07-09 | Stop reason: HOSPADM

## 2019-07-09 RX ADMIN — MIDAZOLAM HYDROCHLORIDE 2 MG: 1 INJECTION, SOLUTION INTRAMUSCULAR; INTRAVENOUS at 11:07

## 2019-07-09 RX ADMIN — GLYCOPYRROLATE 0.2 MG: 0.2 INJECTION, SOLUTION INTRAMUSCULAR; INTRAVENOUS at 12:07

## 2019-07-09 RX ADMIN — PROPOFOL 150 MCG/KG/MIN: 10 INJECTION, EMULSION INTRAVENOUS at 11:07

## 2019-07-09 RX ADMIN — SODIUM CHLORIDE: 0.9 INJECTION, SOLUTION INTRAVENOUS at 11:07

## 2019-07-09 RX ADMIN — LIDOCAINE HYDROCHLORIDE 50 MG: 20 INJECTION, SOLUTION INTRAVENOUS at 11:07

## 2019-07-09 RX ADMIN — ONDANSETRON 4 MG: 2 INJECTION INTRAMUSCULAR; INTRAVENOUS at 11:07

## 2019-07-09 RX ADMIN — CEFTRIAXONE 2 G: 2 INJECTION, SOLUTION INTRAVENOUS at 12:07

## 2019-07-09 RX ADMIN — FAMOTIDINE 20 MG: 10 INJECTION, SOLUTION INTRAVENOUS at 11:07

## 2019-07-09 RX ADMIN — OXYCODONE HYDROCHLORIDE AND ACETAMINOPHEN 1 TABLET: 5; 325 TABLET ORAL at 04:07

## 2019-07-09 RX ADMIN — PROPOFOL 50 MG: 10 INJECTION, EMULSION INTRAVENOUS at 11:07

## 2019-07-09 RX ADMIN — MUPIROCIN 1 G: 20 OINTMENT TOPICAL at 09:07

## 2019-07-09 RX ADMIN — CEFTRIAXONE 2 G: 2 INJECTION, SOLUTION INTRAVENOUS at 04:07

## 2019-07-09 RX ADMIN — PHENYLEPHRINE HYDROCHLORIDE 100 MCG: 10 INJECTION INTRAVENOUS at 12:07

## 2019-07-09 RX ADMIN — FENTANYL CITRATE 25 MCG: 50 INJECTION, SOLUTION INTRAMUSCULAR; INTRAVENOUS at 12:07

## 2019-07-09 NOTE — DISCHARGE INSTRUCTIONS
Incision Care: Chest  Dressing your incision helps keep it clean, dry, and free of infection. That way it will heal faster. Your incision may be open to the air by the time you go home. But if you need to change your dressing, follow the steps below.           1 2 3   Step 1. Wash your hands and set up  · Use liquid soap. Lather for 1 or 2 minutes. Scrub between your fingers and under your nails.  · Rinse with warm water, keeping fingers pointing down. Use a clean towel to dry your hands and turn off the faucet.  · Put all your supplies on a clean cloth or paper towel. Open a plastic trash bag.  · Peel back the edges of the dressing packages.   · Clean the scissors with soap and water. Cut each piece of tape 4 inches longer than the dressing.  Step 2. Remove the old dressing  · Put on disposable gloves.  · Loosen the tape by pulling gently toward the incision. Remove the dressing one layer at a time.  · Look at the dressing to see if there is any drainage. If you see drainage, call your healthcare provider if:  ¨ You see more or less drainage than before  ¨ The color of the drainage has changed  ¨ The drainage has an odor or smells bad  · Put the dressing into a zip-top plastic bag.  · Remove your gloves, put them in the zip-top plastic bag, and seal the bag.  · Wash your hands. Then put on new gloves.  Step 3. Clean and dress the incision  · Gently clean the incision with soap and water. Or use another solution if your doctor tells you to.  ¨ Don't scrub the incision. Wipe it gently.  ¨ Pat the incision dry.  ¨ Don't use any creams, lotions, or antibacterial ointments unless your doctor tells you to.  · Look at the incision to see if it is still closed.  · Look for any redness or swelling around the incision.  · Put all used supplies in a zip-top plastic bag.  · Remove your gloves and put them in the zip-top plastic bag. Seal the bag and put it in the trash.  · Wash your hands again.  When to call your healthcare  provider  Call your healthcare provider right away if you have any of the following:  · Bleeding from the incision, or an opening of the incision where it had been closed  · Increased redness, swelling, or pain in or around the incision  · Increased drainage or foul-smelling drainage. It doesn't matter what the color is.  · Change in the color of the incision  · Fever of 100.4°F (38°C) or higher, or as directed by your healthcare provider   Date Last Reviewed: 10/1/2016  © 5537-6481 [employer]. [address] [place] [address]. All rights reserved. This information is not intended as a substitute for professional medical care. Always follow your healthcare professional's instructions.      Anesthesia: General Anesthesia     You are watched continuously during your procedure by your anesthesia provider.     Youre due to have surgery. During surgery, youll be given medicine called anesthesia or anesthetic. This will keep you comfortable and pain-free. Your anesthesia provider will use general anesthesia.  What is general anesthesia?  General anesthesia puts you into a state like deep sleep. It goes into the bloodstream (IV anesthetics), into the lungs (gas anesthetics), or both. You feel nothing during the procedure. You will not remember it. During the procedure, the anesthesia provider monitors you continuously. He or she checks your heart rate and rhythm, blood pressure, breathing, and blood oxygen.  · IV anesthetics. IV anesthetics are given through an IV line in your arm. Theyre often given first. This is so you are asleep before a gas anesthetic is started. Some kinds of IV anesthetics relieve pain. Others relax you. Your doctor will decide which kind is best in your case.  · Gas anesthetics. Gas anesthetics are breathed into the lungs. They are often used to keep you asleep. They can be given through a facemask or a tube placed in your larynx or trachea (breathing tube).  ¨ If you  have a facemask, your anesthesia provider will most likely place it over your nose and mouth while youre still awake. Youll breathe oxygen through the mask as your IV anesthetic is started. Gas anesthetic may be added through the mask.  ¨ If you have a tube in the larynx or trachea, it will be inserted into your throat after youre asleep.  Anesthesia tools and medicines  You will likely have:  · IV anesthetics. These are put into an IV line into your bloodstream.  · Gas anesthetics. You breathe these anesthetics into your lungs, where they pass into your bloodstream.  · Pulse oximeter. This is a small clip that is attached to the end of your finger. This measures your blood oxygen level.  · Electrocardiography leads (electrodes). These are small sticky pads that are placed on your chest. They record your heart rate and rhythm.  · Blood pressure cuff. This reads your blood pressure.  Risks and possible complications  General anesthesia has some risks. These include:  · Breathing problems  · Nausea and vomiting  · Sore throat or hoarseness (usually temporary)  · Allergic reaction to the anesthetic  · Irregular heartbeat (rare)  · Cardiac arrest (rare)   Anesthesia safety  · Follow all instructions you are given for how long not to eat or drink before your procedure.  · Be sure your doctor knows what medicines and drugs you take. This includes over-the-counter medicines, herbs, supplements, alcohol or other drugs. You will be asked when those were last taken.  · Have an adult family member or friend drive you home after the procedure.  · For the first 24 hours after your surgery:  ¨ Do not drive or use heavy equipment.  ¨ Do not make important decisions or sign legal documents. If important decisions or signing legal documents is necessary during the first 24 hours after surgery, have a trusted family member or spouse act on your behalf.  ¨ Avoid alcohol.  ¨ Have a responsible adult stay with you. He or she can  watch for problems and help keep you safe.  Date Last Reviewed: 12/1/2016  © 7044-5875 [employer]. [address] [place] [address], [place] [address]. All rights reserved. This information is not intended as a substitute for professional medical care. Always follow your healthcare professional's instructions.

## 2019-07-09 NOTE — BRIEF OP NOTE
[facility]  Brief Operative Note    SUMMARY     Surgery Date: 2019     Surgeon(s) and Role:     * Juan F Lozoya MD - Primary    Assisting Surgeon: Rula Weeks MD Resident in neurosurgery    Pre-op Diagnosis:  Parkinson's disease [G20]  Complications due to nervous system device, implant, and graft, initial encounter [T85.9XXA]    Post-op Diagnosis:  Post-Op Diagnosis Codes:     * Parkinson's disease [G20]     * Complications due to nervous system device, implant, and graft, initial encounter [T85.9XXA]    Procedure(s) (LRB):  REPLACE  PULSE GENERATOR, DEEP BRAIN STIMULATOR (Left)    Anesthesia: General    Description of Procedure: Activa-PC pulse generator for DBS    Description of the findings of the procedure: Normal impedance in all channels    Estimated Blood Loss: Less than 25 ml.         Specimens:   Specimen (12h ago, onward)    None

## 2019-07-09 NOTE — ANESTHESIA PREPROCEDURE EVALUATION
07/09/2019  Modesto Mariscal is a 69 y.o., male.    Anesthesia Evaluation    I have reviewed the Patient Summary Reports.    I have reviewed the Nursing Notes.   I have reviewed the Medications.     Review of Systems  Anesthesia Hx:  No problems with previous Anesthesia  History of prior surgery of interest to airway management or planning: Previous anesthesia: General  Denies Personal Hx of Anesthesia complications.   Cardiovascular:  Cardiovascular Normal     Pulmonary:  Pulmonary Normal    Renal/:  Renal/ Normal     Hepatic/GI:   GERD    Neurological:  Neurology Normal Parkinson's disease   Endocrine:  Endocrine Normal      Patient Active Problem List   Diagnosis    Parkinson's disease     Past Medical History:   Diagnosis Date    Diabetes mellitus     Type 2    GERD (gastroesophageal reflux disease)     PD (Parkinson's disease)      Past Surgical History:   Procedure Laterality Date    CHOLECYSTECTOMY      DBS INSERTION      Fiducial screws  N/A 5/9/2016    Performed by [facility] F [facility] MD at [facility] OR 2ND FLR    INSERTION-GENERATOR-DEEP BRAIN STIMULATOR / [employer] GENERATOR FOR DBS-TOM WHITE Left 5/31/2016    Performed by Juan F Lozoya MD at [facility] OR 2ND FLR    INSERTION-STIMULATOR-DEEP BRAIN - BILATERAL / [employer] STIMULATION FOR DBS Bilateral 5/10/2016    Performed by Juan F Lozoya MD at [facility] OR 2ND FLR         Physical Exam  General:  Well nourished    Airway/Jaw/Neck:  Airway Findings: Mouth Opening: Normal Tongue: Normal  General Airway Assessment: Adult  Mallampati: II  TM Distance: Normal, at least 6 cm  Jaw/Neck Findings:  Neck ROM: Normal ROM      Dental:  Dental Findings: In tact   Chest/Lungs:  Chest/Lungs Findings: Clear to auscultation     Heart/Vascular:  Heart Findings: Rate: Normal  Rhythm: Regular Rhythm  Sounds: Normal        Mental Status:  Mental Status  Findings:  Cooperative, Alert and Oriented         Anesthesia Plan  Type of Anesthesia, risks & benefits discussed:  Anesthesia Type:  general  Patient's Preference:   Intra-op Monitoring Plan: standard ASA monitors  Intra-op Monitoring Plan Comments:   Post Op Pain Control Plan: per primary service following discharge from PACU  Post Op Pain Control Plan Comments:   Induction:   IV  Beta Blocker:  Patient is not currently on a Beta-Blocker (No further documentation required).       Informed Consent: Patient understands risks and agrees with Anesthesia plan.  Questions answered. Anesthesia consent signed with patient.  ASA Score: 3     Day of Surgery Review of History & Physical:    H&P update referred to the surgeon.         Ready For Surgery From Anesthesia Perspective.

## 2019-07-09 NOTE — PROGRESS NOTES
Contacted [employer] device rep Marshall Callejas prior to patient departure and confirmed for family that device is turned on.

## 2019-07-09 NOTE — H&P
HPI    67 yo RH male with PD, S/P bilateral DBS in STN with residual tremor in LUE. Patient presents today for IPG exchange. Recently seen by neurology 6/11/19 for DBS adjustment. Family has noticed patient has been sleeping less, is having more headaches.     PMHx:  PD  GERD  T2DM    PSHx:  DBS  Cholecystectomy    Meds:   Current Outpatient Medications on File Prior to Visit   Medication Sig Dispense Refill    amantadine HCl (SYMMETREL) 100 mg capsule Take 1 capsule (100 mg total) by mouth 2 (two) times daily. 180 capsule 3    carbidopa-levodopa  mg (SINEMET)  mg per tablet Take 2 tablets 6:30 AM, 11AM, 3:30 PM. Take 1 tablet at 7PM. 630 tablet 3    clotrimazole-betamethasone 1-0.05% (LOTRISONE) cream     2    ELIQUIS 5 mg Tab          gabapentin (NEURONTIN) 100 MG capsule TAKE 1 CAPSULE BY MOUTH 3 (THREE) TIMES DAILY.        ketorolac (TORADOL) 10 mg tablet     0    levoFLOXacin (LEVAQUIN) 500 MG tablet     0    metformin (GLUCOPHAGE) 1000 MG tablet Take 1,000 mg by mouth daily with breakfast.         olmesartan-hydrochlorothiazide (BENICAR HCT) 40-12.5 mg Tab     11    omeprazole (PRILOSEC) 20 MG capsule Take 20 mg by mouth once daily.        entacapone (COMTAN) 200 mg Tab Take 1 tablet at 6:30 AM, 11 AM, and 3:30 PM with carbidopa / levodopa 270 tablet 3    gabapentin (NEURONTIN) 100 MG capsule Take 200 mg by mouth 2 (two) times daily.         gabapentin (NEURONTIN) 300 MG capsule     5    glipiZIDE (GLUCOTROL) 10 MG TR24 Take 10 mg by mouth daily with breakfast.        HYDROcodone-acetaminophen (NORCO) 5-325 mg per tablet     0    ketoconazole (NIZORAL) 2 % cream APPLY TOPICALLY TO FACE QD AS NEEDED        ofloxacin (OCUFLOX) 0.3 % ophthalmic solution     1    prednisoLONE acetate (PRED FORTE) 1 % DrpS     1    sitagliptin (JANUVIA) 100 MG Tab Take 100 mg by mouth once daily.        tiZANidine (ZANAFLEX) 4 MG tablet     2    valsartan-hydrochlorothiazide (DIOVAN-HCT) 320-12.5 mg  per tablet Take 1 tablet by mouth once daily.        Vitals:    07/09/19 0945   BP: 125/74   Pulse: 86   Resp: 16   Temp: 97.7 °F (36.5 °C)     Awake, alert,  PERRL  Regular rate  No increased work of breathing  Tremor in L arm/hand  PIÑA spontaneously    A/P:  69M w/ PMH of PD who presents today for elective exchange of IPG:    --Patient evaluated prior to surgery  --All diagnostics and imaging reviewed  --Patient NPO since MN  --No anti-coag/plt medication in the last 72h  --Patient consented and all questions answered  --Further reccs to follow s/p surgery

## 2019-07-09 NOTE — TRANSFER OF CARE
Anesthesia Transfer of Care Note    Patient: Modesto Mariscal    Procedure(s) Performed: Procedure(s) (LRB):  REPLACE  PULSE GENERATOR, DEEP BRAIN STIMULATOR (Left)    Patient location: [facility]    Anesthesia Type: general    Transport from OR: Transported from OR on room air with adequate spontaneous ventilation    Post pain: adequate analgesia    Post assessment: no apparent anesthetic complications and tolerated procedure well    Post vital signs: stable    Level of consciousness: awake, alert and oriented    Nausea/Vomiting: no nausea/vomiting    Complications: none    Transfer of care protocol was followed      Last vitals:   Visit Vitals  /74   Pulse 86   Temp 36.5 °C (97.7 °F) (Oral)   Resp 16   Ht 6' (1.829 m)   Wt 98 kg (216 lb)   SpO2 98%   BMI 29.29 kg/m²

## 2019-07-09 NOTE — DISCHARGE SUMMARY
[facility]  Discharge Note  Short Stay    Admit Date: 7/9/2019    Discharge Date and Time: 7/9/19 1700    Attending Physician: Juan F Lozoya MD     Discharge Provider: Rula Coy    Diagnoses:  Active Hospital Problems    Diagnosis  POA    *Parkinson's disease [G20]  Yes      Resolved Hospital Problems   No resolved problems to display.       Discharged Condition: stable    Hospital Course: Patient was admitted for an outpatient procedure and tolerated the procedure well with no complications.    Final Diagnoses: Same as principal problem.    Disposition: Home or Self Care    Follow up/Patient Instructions:    Medications:  Reconciled Home Medications:      Medication List      START taking these medications    oxyCODONE-acetaminophen 5-325 mg per tablet  Commonly known as:  PERCOCET  Take 1 tablet by mouth every 4 (four) hours as needed for Pain.        CONTINUE taking these medications    amantadine HCl 100 mg capsule  Commonly known as:  SYMMETREL  Take 1 capsule (100 mg total) by mouth 2 (two) times daily.     carbidopa-levodopa  mg  mg per tablet  Commonly known as:  SINEMET  Take 2 tablets 6:30 AM, 11AM, 3:30 PM. Take 1 tablet at 7PM.     ELIQUIS 5 mg Tab  Generic drug:  apixaban  5 mg 2 (two) times daily.     gabapentin 100 MG capsule  Commonly known as:  NEURONTIN  Take 200 mg by mouth 2 (two) times daily.     metFORMIN 1000 MG tablet  Commonly known as:  GLUCOPHAGE  Take 1,000 mg by mouth 2 (two) times daily with meals.     ofloxacin 0.3 % ophthalmic solution  Commonly known as:  OCUFLOX     olmesartan-hydrochlorothiazide 40-12.5 mg Tab  Commonly known as:  BENICAR HCT  1 tablet once daily.     omeprazole 20 MG capsule  Commonly known as:  PRILOSEC  Take 20 mg by mouth once daily.     prednisoLONE acetate 1 % Drps  Commonly known as:  PRED FORTE          Discharge Procedure Orders   Diet Adult Regular     Notify your health care provider if you experience any of the following:   temperature >100.4     Notify your health care provider if you experience any of the following:  severe uncontrolled pain     Notify your health care provider if you experience any of the following:  redness, tenderness, or signs of infection (pain, swelling, redness, odor or green/yellow discharge around incision site)     Notify your health care provider if you experience any of the following:  worsening rash     Remove dressing in 48 hours   Order Comments: You may remove gauze in 48 hours  Leave stickers in place, they will fall off on their own    No soaking/submerging of wound     Activity as tolerated     Follow-up Information     Juan F Lozoya MD In 2 weeks.    Specialty:  Neurosurgery  Why:  For wound re-check  [facility] information:  [address] [facility] [id number] [phone]                   Discharge Procedure Orders (must include Diet, Follow-up, Activity):   Discharge Procedure Orders (must include Diet, Follow-up, Activity)   Diet Adult Regular     Notify your health care provider if you experience any of the following:  temperature >100.4     Notify your health care provider if you experience any of the following:  severe uncontrolled pain     Notify your health care provider if you experience any of the following:  redness, tenderness, or signs of infection (pain, swelling, redness, odor or green/yellow discharge around incision site)     Notify your health care provider if you experience any of the following:  worsening rash     Remove dressing in 48 hours   Order Comments: You may remove gauze in 48 hours  Leave stickers in place, they will fall off on their own    No soaking/submerging of wound     Activity as tolerated

## 2019-07-09 NOTE — PLAN OF CARE
Patient tolerated procedure well.  VSS, no complaints of pain, tolerating po.  Preparing for discharge.  AVS reviewed with pt and spouse at bedside, who verbalized understanding of S/S of complications, follow up, activity and bathing restrictions, site care, pain control and general recovery.  IV to be removed following completed Rocephin infusion.

## 2019-07-10 NOTE — OP NOTE
DATE OF PROCEDURE:  2019    ATTENDING PHYSICIAN:  Juan F Lozoya M.D.    PREOPERATIVE DIAGNOSES:  Parkinson's disease and  pulse generator for   deep brain stimulation.    POSTOPERATIVE DIAGNOSES:  Parkinson's disease and  pulse generator for   deep brain stimulation.    PROCEDURE PERFORMED:  Replacement of pulse generator for deep brain stimulation.    SURGEON:  Juan F Lozoya M.D.    ASSISTANT:  Rula Dela Cruz M.D. ([facility] Resident Neurosurgery).    ANESTHESIA:  Xylocaine 1% with epinephrine and IV sedation.    ESTIMATED BLOOD LOSS:  Less than 25 mL.    CONDITION AT THE END OF PROCEDURE:  Satisfactory.    BRIEF HISTORY:  This 69-year-old man with a long history of Parkinson's disease,   had bilateral DBS electrodes placed in the subthalamic nuclei on 05/10/2016.    These have worked well for him.  The pulse generator has now  and needs   to be replaced.    PROCEDURE IN DETAIL:  The patient was placed in the supine position on the   operating table and given IV sedation.  Sequential compression devices were   applied to the legs and various intravenous lines started.  The head of the bed   was somewhat elevated and the head allowed to rest on a donut.  The left upper   chest was shaved, prepped with Betadine, and draped in a sterile fashion.  His   previous incision was outlined and injected with 1% Xylocaine and epinephrine.    The incision was carried through the skin and scarred subcutaneous tissue with   the PlasmaBlade and dissection carried down to the pseudocapsule around the   pulse generator. The skin was dissected off the pulse generator.  The   pseudocapsule opened with Metzenbaum scissors.  The sutures affixing the   generator to the chest wall were cut with the PlasmaBlade and the generator   delivered out of the wound.  This was then detached from the connecting leads   keeping track of which was left and which was right and a new Activa-PC   generator attached to the  connecting leads with the left anterior and the right   posterior.  The screws were tightened with a torque wrench.  The generator was   then placed back into the subcutaneous pocket and affixed to the chest wall with   3-0 silk sutures.  The wound was thoroughly irrigated.  The  then   used to interrogate the system and there was normal impedance in all channels.    After additional irrigation, the subcutaneous tissue was closed in two layers, a   deeper simple and more superficial inverted interrupted 3-0 Vicryl sutures, and   the skin closed with a 4-0 Monocryl suture, half-inch Steri-Strips and   Mastisol, and covered with Telfa and Tegaderm.  The patient was then allowed to   awaken from anesthesia and transferred back to his [facility].  He was returned to   the outpatient recovery area in satisfactory condition.  He received 2 g of   Rocephin at the beginning of the procedure and bacitracin containing antibiotic   irrigating solutions were used throughout.        ILDEFONSO/TOMASA  dd: 07/09/2019 16:51:53 (CDT)  td: 07/09/2019 19:34:13 (CDKAYLIN)  Doc ID   #[id number]  Job ID #[id number]    CC:

## 2019-07-10 NOTE — ANESTHESIA POSTPROCEDURE EVALUATION
Anesthesia Post Evaluation    Patient: Modesto Mariscal    Procedure(s) Performed: Procedure(s) (LRB):  REPLACE  PULSE GENERATOR, DEEP BRAIN STIMULATOR (Left)    Final Anesthesia Type: general  Patient location during evaluation: PACU  Patient participation: Yes- Able to Participate  Level of consciousness: awake and alert and oriented  Post-procedure vital signs: reviewed and stable  Pain management: adequate  Airway patency: patent  PONV status at discharge: No PONV  Anesthetic complications: no      Cardiovascular status: hemodynamically stable  Respiratory status: unassisted, spontaneous ventilation and room air  Hydration status: euvolemic  Follow-up not needed.          Vitals Value Taken Time   /60 2019  5:15 PM   Temp 36.5 °C (97.7 °F) 2019  1:25 PM   Pulse 84 2019  5:20 PM   Resp 18 2019  4:30 PM   SpO2 97 % 2019  5:20 PM   Vitals shown include unvalidated device data.      No case tracking events are documented in the log.      Pain/Ross Score: Pain Rating Prior to Med Admin: 0 (2019  5:00 PM)  Pain Rating Post Med Admin: 0 (2019  5:00 PM)  Ross Score: 10 (2019  5:30 PM)  Modified Ross Score: 20 (2019  5:30 PM)

## 2019-08-01 ENCOUNTER — OFFICE VISIT (OUTPATIENT)
Dept: NEUROSURGERY | Facility: CLINIC | Age: 69
End: 2019-08-01
Payer: MEDICARE

## 2019-08-01 VITALS
HEIGHT: 72 IN | WEIGHT: 217.38 LBS | HEART RATE: 90 BPM | BODY MASS INDEX: 29.44 KG/M2 | SYSTOLIC BLOOD PRESSURE: 123 MMHG | DIASTOLIC BLOOD PRESSURE: 59 MMHG

## 2019-08-01 DIAGNOSIS — G20.A1 PARKINSON'S DISEASE: Primary | ICD-10-CM

## 2019-08-01 PROCEDURE — 99024 PR POST-OP FOLLOW-UP VISIT: ICD-10-PCS | Mod: POP,,, | Performed by: NEUROLOGICAL SURGERY

## 2019-08-01 PROCEDURE — 99024 POSTOP FOLLOW-UP VISIT: CPT | Mod: POP,,, | Performed by: NEUROLOGICAL SURGERY

## 2019-08-01 PROCEDURE — 99999 PR PBB SHADOW E&M-EST. PATIENT-LVL III: CPT | Mod: PBBFAC,,, | Performed by: NEUROLOGICAL SURGERY

## 2019-08-01 PROCEDURE — 99213 OFFICE O/P EST LOW 20 MIN: CPT | Mod: PBBFAC | Performed by: NEUROLOGICAL SURGERY

## 2019-08-01 PROCEDURE — 99999 PR PBB SHADOW E&M-EST. PATIENT-LVL III: ICD-10-PCS | Mod: PBBFAC,,, | Performed by: NEUROLOGICAL SURGERY

## 2019-08-01 RX ORDER — LIDOCAINE AND PRILOCAINE 25; 25 MG/G; MG/G
CREAM TOPICAL
COMMUNITY

## 2019-08-01 RX ORDER — KETOCONAZOLE 20 MG/ML
SHAMPOO, SUSPENSION TOPICAL
Refills: 1 | COMMUNITY
Start: 2019-07-11

## 2019-08-01 RX ORDER — ACETAMINOPHEN AND CODEINE PHOSPHATE 300; 30 MG/1; MG/1
TABLET ORAL
COMMUNITY
Start: 2019-04-26

## 2019-08-01 RX ORDER — GABAPENTIN 300 MG/1
300 CAPSULE ORAL 2 TIMES DAILY
Refills: 11 | COMMUNITY
Start: 2019-07-11

## 2019-08-01 RX ORDER — FLUOCINOLONE ACETONIDE 0.1 MG/ML
SOLUTION TOPICAL
COMMUNITY
Start: 2019-04-24 | End: 2020-04-23

## 2019-08-01 RX ORDER — TRIAMCINOLONE ACETONIDE 1 MG/ML
LOTION TOPICAL
COMMUNITY
Start: 2019-04-24

## 2019-08-01 RX ORDER — OMEPRAZOLE 20 MG/1
20 CAPSULE, DELAYED RELEASE ORAL DAILY
COMMUNITY
Start: 2018-05-17

## 2019-08-01 NOTE — PROGRESS NOTES
This office note has been dictated.  August 1, 2019    Brad Goff M.D.  Department of Neurology  [facility].  [facility] [place]  [address]    RE:  SHASHANK MARISCAL  [facility].:  [id number]    Dear Tom:    Shashank Mariscal returned in neurosurgical followup to the office this morning.    As you know, he was admitted as an outpatient to [facility] and had   replacement of the pulse generator for deep brain stimulation (Activa-PC) on   07/09/19.  He has been doing well postop and returns this morning for a wound   check.    On brief examination, he is alert and cooperative.  His incision is very nicely   healed.  He does have fairly continuous oscillating resting tremor of his left   upper extremity, the right side is quiet.  There is minimal rigidity and   cogwheeling.  He has a slow, somewhat shuffling gait, although not a festinating   gait.    He will plan to follow up with you for any additional planning or   recommendations.    Thank you again for the opportunity to work with you in his care.    Sincerely yours,      ILDEFONSO/TOMASA  dd: 08/01/2019 11:53:25 (CDT)  td: 08/02/2019 02:39:47 (CDT)  Doc ID   #[id number]  Job ID #[id number]    CC: Brad Goff M.D.  Shashank Zambranos

## 2019-08-08 ENCOUNTER — TELEPHONE (OUTPATIENT)
Dept: NEUROLOGY | Facility: CLINIC | Age: 69
End: 2019-08-08

## 2019-08-08 NOTE — TELEPHONE ENCOUNTER
----- Message from Rosalva Toney sent at 8/8/2019  2:14 PM CDT -----  Contact: Marilou (daughter) @ [phone]  Pt had the battery changed in his DBS and would like to schedule a f/u appt with Dr Goff to have it programmed.  Pls call.

## 2019-08-09 NOTE — TELEPHONE ENCOUNTER
Call and spoke to daughter and she states they can not make it to an appointment today. She states patient is having the issues below. She states there is no way they can make it. She is asking if patient can be seen sooner than appointment given. She is asking for a call back from the staff.

## 2019-08-09 NOTE — TELEPHONE ENCOUNTER
----- Message from Amelia Alcaraz sent at 8/9/2019 10:13 AM CDT -----  Contact: Daughter Marilou   Patient got his battery changed in his chest and he is not doing well. He is drooling , sticking his tongue out , shaking real bad and patient needs to get that check out immediately.     [phone] Marilou

## 2019-08-30 ENCOUNTER — OFFICE VISIT (OUTPATIENT)
Dept: NEUROLOGY | Facility: CLINIC | Age: 69
End: 2019-08-30
Payer: MEDICARE

## 2019-08-30 VITALS
SYSTOLIC BLOOD PRESSURE: 114 MMHG | HEIGHT: 73 IN | WEIGHT: 215.81 LBS | HEART RATE: 87 BPM | DIASTOLIC BLOOD PRESSURE: 69 MMHG | BODY MASS INDEX: 28.6 KG/M2

## 2019-08-30 DIAGNOSIS — T42.8X5A LEVODOPA-INDUCED DYSKINESIA: ICD-10-CM

## 2019-08-30 DIAGNOSIS — G24.01 LEVODOPA-INDUCED DYSKINESIA: ICD-10-CM

## 2019-08-30 DIAGNOSIS — Z46.2 ENCOUNTER FOR INTERROGATION OF NEUROSTIMULATOR: ICD-10-CM

## 2019-08-30 DIAGNOSIS — G20.A1 PARKINSON'S DISEASE: ICD-10-CM

## 2019-08-30 PROBLEM — Z96.82 S/P INSERTION OF BRAIN-RESPONSIVE NEUROSTIMULATION DEVICE: Status: ACTIVE | Noted: 2019-08-30

## 2019-08-30 PROBLEM — E11.9 DIABETES MELLITUS: Status: ACTIVE | Noted: 2019-08-30

## 2019-08-30 PROCEDURE — 95983 ALYS BRN NPGT PRGRMG 15 MIN: CPT | Mod: S$PBB,,, | Performed by: PSYCHIATRY & NEUROLOGY

## 2019-08-30 PROCEDURE — 99213 OFFICE O/P EST LOW 20 MIN: CPT | Mod: PBBFAC,25 | Performed by: PSYCHIATRY & NEUROLOGY

## 2019-08-30 PROCEDURE — 95983 PR ELEC ANALYSIS, IMPLT NEURO PULSE GEN, W/PRGRM, BRAIN, 1ST 15 MINS: ICD-10-PCS | Mod: S$PBB,,, | Performed by: PSYCHIATRY & NEUROLOGY

## 2019-08-30 PROCEDURE — 99999 PR PBB SHADOW E&M-EST. PATIENT-LVL III: ICD-10-PCS | Mod: PBBFAC,,, | Performed by: PSYCHIATRY & NEUROLOGY

## 2019-08-30 PROCEDURE — 99999 PR PBB SHADOW E&M-EST. PATIENT-LVL III: CPT | Mod: PBBFAC,,, | Performed by: PSYCHIATRY & NEUROLOGY

## 2019-08-30 PROCEDURE — 95983 ALYS BRN NPGT PRGRMG 15 MIN: CPT | Mod: PBBFAC | Performed by: PSYCHIATRY & NEUROLOGY

## 2019-08-30 PROCEDURE — 99214 OFFICE O/P EST MOD 30 MIN: CPT | Mod: 25,S$PBB,, | Performed by: PSYCHIATRY & NEUROLOGY

## 2019-08-30 PROCEDURE — 99214 PR OFFICE/OUTPT VISIT, EST, LEVL IV, 30-39 MIN: ICD-10-PCS | Mod: 25,S$PBB,, | Performed by: PSYCHIATRY & NEUROLOGY

## 2019-08-30 NOTE — PATIENT INSTRUCTIONS
DBS channel Left tremor balance tongue Getting up from chair   Channel B       Channel C       Channel D         Try each channel for about a week.      To change the channels:  1.  Place access device over your generator and push yellow check button.  2.  Using scroll button, scroll down to the bottom row.  3.  Using scroll button, scroll to right.  A new menu will appear.  4.  Using scroll button, scroll up or down to pick a new channel.  5.  Push the yellow check button and hold device over your generator.

## 2019-08-30 NOTE — PROGRESS NOTES
"Modesto Mariscal I. Chief Complaints during this visit:  New Patient visit for  Parkinson's Disease, DBS      Referring Physician:     Aaareferral Self  No address on file       Primary Care Physician:  Bobby Harrison MD  [address] [facility] [place] [address]    Outside neurologist:  Dr. Harding, [place]    History of present illness:   69 y.o. M seen in f/u for Parkinson's Disease (2013), DBS (bilateral STN 2016, battery replaced 7/9/19), though new to me.  Accompanied by wife.  Seen by me because he could not get in with [place].  Concerned because there have been changes since his battery replacement.  Problems since battery replacement:  "jittery" all the time.  Sticks out tongue "all the time" since battery replacement.    Wife reports balance problems after battery replacement, fallen once.  Fell while turning in kitchen.  No body dyskinesias.    Stopped comtan about 2 months ago.  No clear reason in notes, but has not made any difference on tongue dyskinesias.    Says tremor started on left (not as records would suggest) and says his right hand was never the problem (ummmm, he has left STN DBS placement for this).  Questionable historian.  After further questioning, seems the dbs has never worked well for left hand, but has controlled right.  He has "gotten used to" the tremor on left.    No hallucinations.    Neither patient nor wife are familiar with DBS groups and had no idea there were groups on his DBS.  Both deny ever trying requip or mirapex (in outside notes from 2014).      From 2/19/18 ([place]):   - good DBS programming last time FOR ABOUT A WEEK  - but has been moving around less and prothrombin gene mutation --> Had DVT in R leg and now on eliquis  - no falls, but some stutter steps  - no issues with cognition  -  COMES IN AND HAS BEEN OFF SINCE November 19TH!      From Nov 2017:  - more persistent L hand tremor, R gets worse at other times  - some L toe curling under  - worse " when is nervous or anxious, better if lying flat  - some imbalance, no falls (but once a month has a scare)     From March 2017:  - tremor is under good control in his arms B  - azilect cost jumped after went generic  As plan stopped paying for brand  - stalevo also not paid  - L arm tremor still comes out when stressed or tired  - no wearing off if takes the meds on time, unless late evening stretch for longer than 6 hours     HPI: 67 yo RH male with PD, S/P B DBS, last seen in office for programming 8-9-16. He is accompanied today by his wife. Notes tremor BH- LH>RH. He is aware of tremor daily. He leonela last program without difficulty. Wife states that toes are curling up. Began one to two months ago. Occurs when standing all day and then goes to take shoes off.  Typically doses [id number]    Objective       II.  Review of systems:  As in HPI, otherwise, balance 10 systems reviewed and are negative.    III.  Past Medical History:   Diagnosis Date    Diabetes mellitus     Type 2    GERD (gastroesophageal reflux disease)     PD (Parkinson's disease)      History reviewed. No pertinent family history.  Social History:  Retired, taking care of 5yo grandchild and 87yo mil       Current Neuro medications:   Amantadine 100 bid  Carbidopa-levodopa 25/100 2 pills at 6;30am, 11am, 3:30pm and 1 pill at 7pm      Current Outpatient Medications   Medication Sig Dispense Refill    acetaminophen-codeine 300-30mg (TYLENOL #3) 300-30 mg Tab       amantadine HCl (SYMMETREL) 100 mg capsule Take 1 capsule (100 mg total) by mouth 2 (two) times daily. 180 capsule 3    carbidopa-levodopa  mg (SINEMET)  mg per tablet Take 2 tablets 6:30 AM, 11AM, 3:30 PM. Take 1 tablet at 7PM. 630 tablet 3    ELIQUIS 5 mg Tab 5 mg 2 (two) times daily.       fluocinolone (SYNALAR) 0.01 % external solution Apply topically twice daily to ears      gabapentin (NEURONTIN) 300 MG capsule   11    ketoconazole (NIZORAL) 2 % shampoo   1  "   lidocaine-prilocaine (EMLA) cream Apply topically.      metformin (GLUCOPHAGE) 1000 MG tablet Take 1,000 mg by mouth 2 (two) times daily with meals.       ofloxacin (OCUFLOX) 0.3 % ophthalmic solution   1    olmesartan-hydrochlorothiazide (BENICAR HCT) 40-12.5 mg Tab 1 tablet once daily.   11    omeprazole (PRILOSEC) 20 MG capsule TAKE 1 CAPSULE BY MOUTH DAILY.      oxyCODONE-acetaminophen (PERCOCET) 5-325 mg per tablet Take 1 tablet by mouth every 4 (four) hours as needed for Pain. 10 tablet 0    prednisoLONE acetate (PRED FORTE) 1 % DrpS   1    triamcinolone acetonide 0.1% (KENALOG) 0.1 % Lotn apply twice daily to rash on legs as needed       No current facility-administered medications for this visit.       PRIOR NEURO MEDICATIONS TRIED:  Azilect, requip XL (prior neurologist tried), mirapex (?)     Review of patient's allergies indicates:  No Known Allergies      IV. Physical Exam   (Includes Motor Unified Parkinson's Disease Rating Scale 2008)    Vitals:    08/30/19 0854   BP: 114/69   Pulse: 87   Weight: 97.9 kg (215 lb 13.3 oz)   Height: 6' 1" (1.854 m)       General appearance: Well nourished, well developed, no acute distress       Cardiovascular:  pedal pulses 2, no edema or cyanosis, heart regular rate and rhythym, no carotid bruits       -------------------------------------------------------------  Affect: full       Orientation to time & place:  Oriented to time, place, person and situation       Attention & concentration:  Normal attention span and concentration       Memory:  Recent and remote memory intact  Language:  Spontaneous, fluent; able to repeat and name objects        Fund of knowledge:  Aware of current events        -------------------------------------------------------  Cranial nerves: normal visual acuity, visual fields full, optic discs not visualized, pupils equal round and reactive, extraocular movements intact,       facial sensation intact, face symmetrical, hearing " intact to whisper, palate raises midline, shoulder shrug strength normal, tongue protrudes midline.        -------------------------------------------------------  Muscle Bulk: all 4 extremities normal                                                   Muscle strength:  5/5 in all 4 extremities        No pronator drift  Sensation: All extremities grossly intact to touch          Deep tendon Reflexes: 2 bilateral biceps, triceps and patella        --------------------------------------------------------------  Unified Parkinson's Disease Rating Scale (motor part only)      UPDRS Motor Examination      Speech  2 - Monotone, slurred but understandable; moderately impaired.   Facial Expression  3 - Moderate hypomimia; lips parted some of the time.   Rigidity     Neck 1 - Slight or detectable only when activated by mirror or other movements.   Upper Extremity: Right 0 - Absent.   Upper Extremity: Left 2 - Mild or moderate.   Lower Extremity: Right 1 - Slight or detectable only when activated by mirror or other movements.   Lower Extremity: Left 1 - Slight or detectable only when activated by mirror or other movements.     Finger Taps      right 1 - Mild slowing and/or reduction in amplitude.   left 2 - Moderately impaired. Definite and early fatiguing. May have occasional arrests in movement.   Hand Movements      right 1 - Mild slowing and/or reduction in amplitude.   left 3 - Severely impaired. Frequent hesitation in initiating movements or arrests in ongoing movement.   Pronation/supination of Hands      right 1 - Mild slowing and/or reduction in amplitude.   left 2 - Moderately impaired. Definite and early fatiguing. May have occasional arrests in movement.     Toe Tapping    right 2 - Moderately impaired. Definite and early fatiguing. May have occasional arrests in movement.   left 2 - Moderately impaired. Definite and early fatiguing. May have occasional arrests in movement.     Leg Agility      right 1 - Mild  slowing and/or reduction in amplitude.   left 1 - Mild slowing and/or reduction in amplitude.   Arising from Chair  2 - Pushes self up from arms of seat.   Posture  2 - Moderately stooped posture, definitely abnormal; can be slightly leaning ot one side.    Gait  2 - Walks with difficulty, but requires little or no assistance; may have some festination, short steps, or propulsion.   Freezing of gait 0   Postural Stability (Response to sudden, strong posterior displacement produced by pull on shoulders while patient erect with eyes open and feet slightly apart.   Not tested   Body Bradykinesia and Hypokinesia (Combining slowness, hesitancy, decreased armswing, small amplitude, and poverty of movement in general)  2 - Mild degree of slowness and poverty of movement which is definitely abnormal.     Tremor at Rest:      Face, lips, chin 0 - Absent.    Hands:      right 0 - Absent.    left 2 - Mild in amplitude and persistent. Or moderate in amplitude, but only intermittently present.    Feet:     right 0 - Absent.    left 0 - Absent.    Constancy of REST tremor: 3: Moderate: Tremor at rest is present 51-75% of the entire examination period.    Postural tremor:    right 0 - Absent.    left 2 - Mild in amplitude and persistent. Or moderate in amplitude, but only intermittently present.    Kinetic tremor:    right 0 - Absent.    left 0 - Absent.    Dyskinesias present? Yes, tongue protrusion       Total Motor UPDRS:  41         V.  Laboratory/ Radiological Data:   8/30/19  IPG interrogated.  Battery:  2.94v  Impedances:    Left 770/3.8ma  Right; program 1:  1,020/2.9ma  Right, program 2:  1,1168/ 2.6ma    Initial settings:  LEFT Target Group contacts V/ mA PW RATE  RIGHT Target Group contacts V/ mA PW RATE NOTES    STN A 2-, c+ 3.0v 90 100   STN1 A 1-,2-,3+ 3.0v 150 100 ACTIVE            STN2  0+,1- 3.0 150 100 ACTIVE     B 2-, C+ 2.6v 90 180    B 1+, 2-, 3- 3.0v 90 180      C 2-, C+ 2.6v 90 120   STN1 C 0-,1-, C+ 0.2v 60  "120 Best gait            STN2 C 2-,3-, C+ 0.5v 60 120        END settings:  LEFT Target Group contacts V/ mA PW RATE  RIGHT Target Group contacts V/ mA PW RATE NOTES    STN A 2-, c+ 3.0v 90 100   STN1 A 1-,2-,3+ 3.0v 150 100 ACTIVE            STN2  0+,1- 3.0 150 100 ACTIVE     B 2-, C+ 2.6v 90 180    B 1+, 2-, 3- 3.0v 90 180      C 2-, C+ 2.6v 90 120   STN1 C 0-,1-, C+ 0.2v 60 120 Best gait            STN2 C 2-,3-, C+ 0.5v 60 120      D 2-,C+ 2.6v 90 120    D OFF    NEW              VI. Assessment and Plan:  Diagnosis: Parkinson's disease        UPDRS-ADL Scale:  70% Not completely independent. More difficulty with some chores. Three to four times as long in some. Must spend a large part of the day with chores.  Asael and Yahr staging:  Stage 2.5 Mild bilateral disease, with recovery on pull test.           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  However, the stimulation since battery change appears to have been too high and causing imbalance.  Wife and daughter could see immediate improvement in posture, gait when I changed to lower-voltage channel.   -> patient to test channels over next 4 weeks to find "best"   -> consider adding back comtan as the presence of this may have been helping gait/balance             Encounter for interrogation of neurostimulator    Current Assessment & Plan     IPG interrogated and programmed over 30 min today.  Trained patient, wife and daughter on how to change channels.      END settings:  LEFT Target Group contacts V/ mA PW RATE  RIGHT Target Group contacts V/ mA PW RATE NOTES    STN A 2-, c+ 3.0v 90 100   STN1 A 1-,2-,3+ 3.0v 150 100 ACTIVE            STN2  0+,1- 3.0 150 100 ACTIVE     B 2-, C+ 2.6v 90 180    B 1+, 2-, " 3- 3.0v 90 180      C 2-, C+ 2.6v 90 120   STN1 C 0-,1-, C+ 0.2v 60 120 Best gait            STN2 C 2-,3-, C+ 0.5v 60 120      D 2-,C+ 2.6v 90 120    D OFF    NEW                 2     Levodopa-induced dyskinesia    Overview     Eda-lingual         Current Assessment & Plan     Tongue protrusions, embarrassing, but not interfering with eating.   -> explained he may have to live with this s/e (wife more concerned about it than he)                 Follow up in about 4 weeks (around 9/27/2019).

## 2019-08-30 NOTE — ASSESSMENT & PLAN NOTE
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  However, the stimulation since battery change appears to have been too high and causing imbalance.  Wife and daughter could see immediate improvement in posture, gait when I changed to lower-voltage channel.   -> patient to test channels over next 4 weeks to find "best"   -> consider adding back comtan as the presence of this may have been helping gait/balance      "

## 2019-08-30 NOTE — ASSESSMENT & PLAN NOTE
Tongue protrusions, embarrassing, but not interfering with eating.   -> explained he may have to live with this s/e (wife more concerned about it than he)

## 2019-10-21 ENCOUNTER — OFFICE VISIT (OUTPATIENT)
Dept: NEUROLOGY | Facility: CLINIC | Age: 69
End: 2019-10-21
Payer: MEDICARE

## 2019-10-21 VITALS
BODY MASS INDEX: 28.78 KG/M2 | DIASTOLIC BLOOD PRESSURE: 74 MMHG | HEIGHT: 73 IN | WEIGHT: 217.13 LBS | SYSTOLIC BLOOD PRESSURE: 124 MMHG | HEART RATE: 86 BPM

## 2019-10-21 DIAGNOSIS — G24.01 LEVODOPA-INDUCED DYSKINESIA: ICD-10-CM

## 2019-10-21 DIAGNOSIS — T42.8X5A LEVODOPA-INDUCED DYSKINESIA: ICD-10-CM

## 2019-10-21 DIAGNOSIS — Z46.2 ENCOUNTER FOR INTERROGATION OF NEUROSTIMULATOR: ICD-10-CM

## 2019-10-21 DIAGNOSIS — Z96.82 PRESENCE OF NEUROSTIMULATOR: ICD-10-CM

## 2019-10-21 DIAGNOSIS — G20.A1 PARKINSON'S DISEASE: Primary | ICD-10-CM

## 2019-10-21 PROCEDURE — 99214 OFFICE O/P EST MOD 30 MIN: CPT | Mod: PBBFAC | Performed by: PSYCHIATRY & NEUROLOGY

## 2019-10-21 PROCEDURE — 95983 PR ELEC ANALYSIS, IMPLT NEURO PULSE GEN, W/PRGRM, BRAIN, 1ST 15 MINS: ICD-10-PCS | Mod: S$PBB,,, | Performed by: PSYCHIATRY & NEUROLOGY

## 2019-10-21 PROCEDURE — 95983 ALYS BRN NPGT PRGRMG 15 MIN: CPT | Mod: PBBFAC | Performed by: PSYCHIATRY & NEUROLOGY

## 2019-10-21 PROCEDURE — 99999 PR PBB SHADOW E&M-EST. PATIENT-LVL IV: CPT | Mod: PBBFAC,,, | Performed by: PSYCHIATRY & NEUROLOGY

## 2019-10-21 PROCEDURE — 99214 PR OFFICE/OUTPT VISIT, EST, LEVL IV, 30-39 MIN: ICD-10-PCS | Mod: 25,S$PBB,, | Performed by: PSYCHIATRY & NEUROLOGY

## 2019-10-21 PROCEDURE — 99999 PR PBB SHADOW E&M-EST. PATIENT-LVL IV: ICD-10-PCS | Mod: PBBFAC,,, | Performed by: PSYCHIATRY & NEUROLOGY

## 2019-10-21 PROCEDURE — 99214 OFFICE O/P EST MOD 30 MIN: CPT | Mod: 25,S$PBB,, | Performed by: PSYCHIATRY & NEUROLOGY

## 2019-10-21 PROCEDURE — 95983 ALYS BRN NPGT PRGRMG 15 MIN: CPT | Mod: S$PBB,,, | Performed by: PSYCHIATRY & NEUROLOGY

## 2019-10-21 RX ORDER — GLIPIZIDE 10 MG/1
10 TABLET ORAL DAILY
COMMUNITY
Start: 2017-11-06

## 2019-10-21 NOTE — PROGRESS NOTES
"Modesto Mariscal I. Chief Complaints during this visit:  f/u Patient visit for  Parkinson's Disease, DBS      Referring Physician:     No referring provider defined for this encounter.       Primary Care Physician:  Bobby Harrison MD  [address] [facility] [place] [address]    Outside neurologist:  Dr. Harding Ivel    History of present illness:   69 y.o. M seen in f/u for Parkinson's Disease (2013), DBS (bilateral STN 2016, battery replaced 7/9/19), Accompanied by wife and daughter.    The jittery feeling has been less.    Best group was [facility] (what he left on last visit) (we think).  He left his notes at home.  Fell a week ago. Denies ever having PT for Parkinson's Disease.      8/30/19  Seen by me because he could not get in with [place].  Concerned because there have been changes since his battery replacement.  Problems since battery replacement:  "jittery" all the time.  Sticks out tongue "all the time" since battery replacement.    Wife reports balance problems after battery replacement, fallen once.  Fell while turning in kitchen.  No body dyskinesias.  Stopped comtan about 2 months ago.  No clear reason in notes, but has not made any difference on tongue dyskinesias.  Says tremor started on left (not as records would suggest) and says his right hand was never the problem ([facility], he has left STN DBS placement for this).  Questionable historian.  After further questioning, seems the dbs has never worked well for left hand, but has controlled right.  He has "gotten used to" the tremor on left.  No hallucinations.  Neither patient nor wife are familiar with DBS groups and had no idea there were groups on his DBS.  Both deny ever trying requip or mirapex (in outside notes from 2014).    From 2/19/18 ([place]):   - good DBS programming last time FOR ABOUT A WEEK  - but has been moving around less and prothrombin gene mutation --> Had DVT in R leg and now on eliquis  - no falls, but some " stutter steps  - no issues with cognition  -  COMES IN AND HAS BEEN OFF SINCE November 19TH!      From Nov 2017:  - more persistent L hand tremor, R gets worse at other times  - some L toe curling under  - worse when is nervous or anxious, better if lying flat  - some imbalance, no falls (but once a month has a scare)     From March 2017:  - tremor is under good control in his arms B  - azilect cost jumped after went generic  As plan stopped paying for brand  - stalevo also not paid  - L arm tremor still comes out when stressed or tired  - no wearing off if takes the meds on time, unless late evening stretch for longer than 6 hours     HPI: 67 yo RH male with PD, S/P B DBS, last seen in office for programming 8-9-16. He is accompanied today by his wife. Notes tremor BH- LH>RH. He is aware of tremor daily. He leonela last program without difficulty. Wife states that toes are curling up. Began one to two months ago. Occurs when standing all day and then goes to take shoes off.  Typically doses [id number]    II.  Review of systems:  As in HPI, otherwise, balance 3 systems reviewed and are negative.    III.  Past Medical History:   Diagnosis Date    Diabetes mellitus     Type 2    GERD (gastroesophageal reflux disease)     Parkinson's disease 4/28/2016    Right tremor 2013; s/p bilateral STN DBS 2016    Presence of neurostimulator 10/21/2019    Deep brain stimulator, bilateral STN.  Generator in left chest (Activa-PC).     History reviewed. No pertinent family history.  Social History:  Retired, taking care of 7yo grandchild and 89yo mil       Current Neuro medications:   Amantadine 100 bid  Carbidopa-levodopa 25/100 2 pills at 6;30am, 11am, 3:30pm and 1 pill at 7pm      Current Outpatient Medications   Medication Sig Dispense Refill    acetaminophen-codeine 300-30mg (TYLENOL #3) 300-30 mg Tab       amantadine HCl (SYMMETREL) 100 mg capsule Take 1 capsule (100 mg total) by mouth 2 (two) times daily. 180 capsule 3  "   carbidopa-levodopa  mg (SINEMET)  mg per tablet Take 2 tablets 6:30 AM, 11AM, 3:30 PM. Take 1 tablet at 7PM. 630 tablet 3    ELIQUIS 5 mg Tab 5 mg 2 (two) times daily.       fluocinolone (SYNALAR) 0.01 % external solution Apply topically twice daily to ears      gabapentin (NEURONTIN) 300 MG capsule Take 300 mg by mouth 2 (two) times daily.   11    glipiZIDE (GLUCOTROL) 10 MG tablet Take 10 mg by mouth once daily.      ketoconazole (NIZORAL) 2 % shampoo   1    lidocaine-prilocaine (EMLA) cream Apply topically.      metformin (GLUCOPHAGE) 1000 MG tablet Take 1,000 mg by mouth 2 (two) times daily with meals.       ofloxacin (OCUFLOX) 0.3 % ophthalmic solution   1    olmesartan-hydrochlorothiazide (BENICAR HCT) 40-12.5 mg Tab 1 tablet once daily.   11    omeprazole (PRILOSEC) 20 MG capsule Take 20 mg by mouth once daily.       prednisoLONE acetate (PRED FORTE) 1 % DrpS   1    triamcinolone acetonide 0.1% (KENALOG) 0.1 % Lotn apply twice daily to rash on legs as needed      oxyCODONE-acetaminophen (PERCOCET) 5-325 mg per tablet Take 1 tablet by mouth every 4 (four) hours as needed for Pain. (Patient not taking: Reported on 10/21/2019) 10 tablet 0     No current facility-administered medications for this visit.       PRIOR NEURO MEDICATIONS TRIED:  Azilect, requip XL (prior neurologist tried), mirapex (?)     Review of patient's allergies indicates:  No Known Allergies      IV. Physical Exam   (Includes Motor Unified Parkinson's Disease Rating Scale 2008)    Vitals:    10/21/19 0946   BP: 124/74   Pulse: 86   Weight: 98.5 kg (217 lb 2.5 oz)   Height: 6' 1" (1.854 m)       General appearance: Well nourished, well developed, no acute distress       Cardiovascular:  pedal pulses 2, no edema or cyanosis, heart regular rate and rhythym, no carotid bruits       -------------------------------------------------------------  Affect: full       Orientation to time & place:  Oriented to time, place, " person and situation       Attention & concentration:  Normal attention span and concentration       Memory:  Recent and remote memory intact  Language:  Spontaneous, fluent; able to repeat and name objects        Fund of knowledge:  Aware of current events        -------------------------------------------------------  Cranial nerves: normal visual acuity, visual fields full, optic discs not visualized, pupils equal round and reactive, extraocular movements intact,       facial sensation intact, face symmetrical, hearing intact to whisper, palate raises midline, shoulder shrug strength normal, tongue protrudes midline.        -------------------------------------------------------  Muscle Bulk: all 4 extremities normal                                                   Muscle strength:  5/5 in all 4 extremities        No pronator drift  Sensation: All extremities grossly intact to touch          Deep tendon Reflexes: 2 bilateral biceps, triceps and patella        --------------------------------------------------------------  Unified Parkinson's Disease Rating Scale (motor part only)      UPDRS Motor Examination      Speech  2 - Monotone, slurred but understandable; moderately impaired.   Facial Expression  3 - Moderate hypomimia; lips parted some of the time.   Rigidity     Neck 1 - Slight or detectable only when activated by mirror or other movements.   Upper Extremity: Right 0 - Absent.   Upper Extremity: Left 2 - Mild or moderate.   Lower Extremity: Right 1 - Slight or detectable only when activated by mirror or other movements.   Lower Extremity: Left 1 - Slight or detectable only when activated by mirror or other movements.     Finger Taps      right 1 - Mild slowing and/or reduction in amplitude.   left 2 - Moderately impaired. Definite and early fatiguing. May have occasional arrests in movement.   Hand Movements      right 1 - Mild slowing and/or reduction in amplitude.   left 3 - Severely impaired.  Frequent hesitation in initiating movements or arrests in ongoing movement.   Pronation/supination of Hands      right 1 - Mild slowing and/or reduction in amplitude.   left 2 - Moderately impaired. Definite and early fatiguing. May have occasional arrests in movement.     Toe Tapping    right 2 - Moderately impaired. Definite and early fatiguing. May have occasional arrests in movement.   left 2 - Moderately impaired. Definite and early fatiguing. May have occasional arrests in movement.     Leg Agility      right 1 - Mild slowing and/or reduction in amplitude.   left 1 - Mild slowing and/or reduction in amplitude.   Arising from Chair  2 - Pushes self up from arms of seat.   Posture  2 - Moderately stooped posture, definitely abnormal; can be slightly leaning ot one side.    Gait  2 - Walks with difficulty, but requires little or no assistance; may have some festination, short steps, or propulsion.   Freezing of gait 0   Postural Stability (Response to sudden, strong posterior displacement produced by pull on shoulders while patient erect with eyes open and feet slightly apart.   Not tested   Body Bradykinesia and Hypokinesia (Combining slowness, hesitancy, decreased armswing, small amplitude, and poverty of movement in general)  2 - Mild degree of slowness and poverty of movement which is definitely abnormal.     Tremor at Rest:      Face, lips, chin 0 - Absent.    Hands:      right 0 - Absent.    left 2 - Mild in amplitude and persistent. Or moderate in amplitude, but only intermittently present.    Feet:     right 0 - Absent.    left 0 - Absent.    Constancy of REST tremor: 3: Moderate: Tremor at rest is present 51-75% of the entire examination period.    Postural tremor:    right 0 - Absent.    left 2 - Mild in amplitude and persistent. Or moderate in amplitude, but only intermittently present.    Kinetic tremor:    right 0 - Absent.    left 0 - Absent.    Dyskinesias present? Yes, tongue protrusion        Total Motor UPDRS:  41         V.  Laboratory/ Radiological Data:     10/21/19  IPG interrogated.  Battery:  2.94v  Initial settings:  LEFT Target Group contacts V/ mA PW RATE IMP curr  RIGHT Target Group contacts V/ mA PW RATE IMP curr NOTES    STN A 2-, c+ 3.0v 90 100     STN1 A 1-,2-,3+ 3.0v 150 100                 STN2  0+,1- 3.0 150 100        B 2-, C+ 2.6v 90 180      B 1+, 2-, 3- 3.0v 90 180   Active, not good for tremor or balance     C 2-, C+ 2.6v 90 120     STN1 C 0-,1-, C+ 0.2v 60 120                 STN2 C 2-,3-, C+ 0.5v 60 120        D 2-,C+ 2.6v 90 120      D OFF      Best for balance     Final settings  LEFT Target Group contacts V/ mA PW RATE IMP curr  RIGHT Target Group contacts V/ mA PW RATE IMP curr NOTES    STN A 2-, c+ 3.0v 90 100     STN1 A 1-,2-,3+ 3.0v 150 100                 STN2  0+,1- 3.0 150 100        B 2-, C+ 2.6v 90 120      B 1+, 2-, 3- 2.5v 90 120   NEW     C 2-, C+ 2.6v 90 120     STN1 C 0-,1-, C+ 0.2v 60 120                 STN2 C 2-,3-, C+ 0.5v 60 120        D 2-,C+ 2.6v 90 120      D OFF      Best for balance, ACTIVE     8/30/19  IPG interrogated.  Battery:  2.94v  Impedances:    Left 770/3.8ma  Right; program 1:  1,020/2.9ma  Right, program 2:  1,1168/ 2.6ma    Initial settings:  LEFT Target Group contacts V/ mA PW RATE  RIGHT Target Group contacts V/ mA PW RATE NOTES    STN A 2-, c+ 3.0v 90 100   STN1 A 1-,2-,3+ 3.0v 150 100 ACTIVE            STN2  0+,1- 3.0 150 100 ACTIVE     B 2-, C+ 2.6v 90 180    B 1+, 2-, 3- 3.0v 90 180      C 2-, C+ 2.6v 90 120   STN1 C 0-,1-, C+ 0.2v 60 120 Best gait            STN2 C 2-,3-, C+ 0.5v 60 120        END settings:  LEFT Target Group contacts V/ mA PW RATE  RIGHT Target Group contacts V/ mA PW RATE NOTES    STN A 2-, c+ 3.0v 90 100   STN1 A 1-,2-,3+ 3.0v 150 100             STN2  0+,1- 3.0 150 100      B 2-, C+ 2.6v 90 180    B 1+, 2-, 3- 3.0v 90 180      C 2-, C+ 2.6v 90 120   STN1 C 0-,1-, C+ 0.2v 60 120 Best gait            STN2 C  "2-,3-, C+ 0.5v 60 120      D 2-,C+ 2.6v 90 120    D OFF    NEW              VI. Assessment and Plan:  Diagnosis: Parkinson's disease        UPDRS-ADL Scale:  70% Not completely independent. More difficulty with some chores. Three to four times as long in some. Must spend a large part of the day with chores.  Asael and Yahr staging:  Stage 2.5 Mild bilateral disease, with recovery on pull test.           Problem List Items Addressed This Visit        1 - High    Parkinson's disease - Primary    Overview     Right tremor 2013; s/p bilateral STN DBS 2016         Current Assessment & Plan     Balance and "jittery" doing better with group D (left lead off), but we are both unclear what the other channels did (or not) on his symptoms.   -> patient, again, to test channels over next 4 weeks to find "best"   -> given a new "B"   -> PT/OT   -> consider adding back comtan as the presence of this may have been helping gait/balance        I have changed the settings of Group B (now new)  A, C are still the same ([place]'s settings).  Group D is my setting from August (unchanged).    Please compare these 4 channels over the next 2 weeks.    And message me through [employer] how you are doing.         Relevant Orders    Ambulatory Referral to Physical/Occupational Therapy    Ambulatory consult to Occupational Therapy    Encounter for interrogation of neurostimulator    Overview     Bilateral STN 2016, Left chest IPG (Activa PC); right tremor controlled, left not, but "lives with it"         Presence of neurostimulator    Overview     Deep brain stimulator, bilateral STN.  Generator in left chest (Activa-PC).            2     Levodopa-induced dyskinesia    Overview     Eda-lingual         Current Assessment & Plan     Not causing choking or difficulty eating.   -> Advised gum/lozenge.                 Follow up in about 4 months (around 2/21/2020) for PD, DBS.      "

## 2019-10-21 NOTE — ASSESSMENT & PLAN NOTE
"Balance and "jittery" doing better with group D (left lead off), but we are both unclear what the other channels did (or not) on his symptoms.   -> patient, again, to test channels over next 4 weeks to find "best"   -> given a new "B"   -> PT/OT   -> consider adding back comtan as the presence of this may have been helping gait/balance        I have changed the settings of Group B (now new)  A, C are still the same ([place]'s settings).  Group D is my setting from August (unchanged).    Please compare these 4 channels over the next 2 weeks.    And message me through [employer] how you are doing.  "

## 2019-10-21 NOTE — PATIENT INSTRUCTIONS
I have changed the settings of Group B (now new)  A, C are still the same ([place]'s settings).  Group D is my setting from August (unchanged).    Please compare these 4 channels over the next 2 weeks.    And message me through [facility] how you are doing.

## 2020-01-09 DIAGNOSIS — G20.A1 PARKINSON DISEASE: ICD-10-CM

## 2020-01-09 RX ORDER — CARBIDOPA AND LEVODOPA 25; 100 MG/1; MG/1
TABLET ORAL
Qty: 630 TABLET | Refills: 3 | Status: SHIPPED | OUTPATIENT
Start: 2020-01-09 | End: 2021-04-16

## 2020-01-24 ENCOUNTER — TELEPHONE (OUTPATIENT)
Dept: NEUROLOGY | Facility: CLINIC | Age: 70
End: 2020-01-24

## 2020-01-24 NOTE — TELEPHONE ENCOUNTER
----- Message from Annie Bryant MA sent at 1/23/2020  4:38 PM CST -----  Contact: Pt son Marco [phone]      ----- Message -----  From: Rain Raphael  Sent: 1/23/2020   1:52 PM CST  To: Christina PETERSON Staff    Pt son states pt broke his hip    Pt son is requesting a video conference    Pt son states that the pt can not travel right now    Pt son states pt balance is not right    Pt son Marco [phone]

## 2020-01-24 NOTE — TELEPHONE ENCOUNTER
Called and spoke Marco (Son) regarding his condition. He stated that his dad fall and broke his hip and his left leg is dragging which cause the fall. He thinks it has something to do with his DBS. I stated that  will do telephone with them on Jan 29 Wednesday at 10:00AM

## 2020-01-29 ENCOUNTER — TELEPHONE (OUTPATIENT)
Dept: NEUROLOGY | Facility: CLINIC | Age: 70
End: 2020-01-29

## 2020-01-29 NOTE — TELEPHONE ENCOUNTER
Spoke with Marco.  Fell, broke right hip   On bedrest.  Left arm has uncontrolled tremors and shuffling, again.  Patient says tremors are the same as last fall.    Son checked battery for me.  On channel D.  Walked him through turning him back to channel A (prior channel).      Phone call 1/24/20  Called and spoke Marco (Son) regarding his condition. He stated that his dad fall and broke his hip and his left leg is dragging which cause the fall. He thinks it has something to do with his DBS. I stated that  will do telephone with them on Jan 29 Wednesday at 10:00AM

## 2020-02-17 RX ORDER — AMANTADINE HYDROCHLORIDE 100 MG/1
100 CAPSULE, GELATIN COATED ORAL 2 TIMES DAILY
Qty: 180 CAPSULE | Refills: 2 | Status: SHIPPED | OUTPATIENT
Start: 2020-02-17 | End: 2021-03-29

## 2020-03-04 ENCOUNTER — TELEPHONE (OUTPATIENT)
Dept: NEUROLOGY | Facility: CLINIC | Age: 70
End: 2020-03-04

## 2020-03-04 NOTE — TELEPHONE ENCOUNTER
My note 10/21/19 (he left clinic on channel D):    I have changed the settings of Group B (now new)  A, C are still the same ([place]'s settings).  Group D is my setting from August (unchanged).     Please compare these 4 channels over the next 2 weeks.     And message me through #[employer] how you are doing.    Spoke with Marco.  Unable to make appointment tomorrow.  We'll reschedule for April/may.  Asks if anything to do with his DBS regarding surgery.  Questions about appetite.  Advised he talk to pcp as other conditions (thyroid disorder, cancer) would need to be ruled out before starting megace.

## 2020-03-04 NOTE — TELEPHONE ENCOUNTER
----- Message from López Lozoya sent at 3/4/2020  9:18 AM CST -----  Contact: Pt's Son Marco [phone]  Pt's Son Marco would like to be called back asap regarding questions prior to pt's hip replacement surgery 3/20/20.  Son can be reached at [phone].    Thanks

## 2020-03-04 NOTE — TELEPHONE ENCOUNTER
"Spoke with patient's son. Surgery is 3/20/20.  He will have a partial hip replacement, remain in the hospital for 1-2 days, then go into a "swing bed" rehab.    Son is concerned about his appetite is decreased and gait is getting worse, more shuffling. They cancelled follow up appt in [place] tomorrow d/t pt being in too much pain.    Requests cb from Dr Vasquez if channel can be changed on DBS. States he did not notice a big change when channel was changed last month.  "

## 2020-04-01 ENCOUNTER — TELEPHONE (OUTPATIENT)
Dept: NEUROLOGY | Facility: CLINIC | Age: 70
End: 2020-04-01

## 2020-07-01 ENCOUNTER — TELEPHONE (OUTPATIENT)
Dept: NEUROLOGY | Facility: CLINIC | Age: 70
End: 2020-07-01

## 2020-07-01 DIAGNOSIS — R63.4 WEIGHT LOSS: Primary | ICD-10-CM

## 2020-07-01 NOTE — TELEPHONE ENCOUNTER
----- Message from Shemar Yadav sent at 7/1/2020  2:58 PM CDT -----  Regarding: /son   called in and wanted to speak with someone at the office regarding his father. He mention that he has missed several calls from the office and would like to speak with someone as soon as possible.  can be reached at    [phone]

## 2020-07-06 ENCOUNTER — DOCUMENTATION ONLY (OUTPATIENT)
Dept: NEUROLOGY | Facility: CLINIC | Age: 70
End: 2020-07-06

## 2020-07-06 ENCOUNTER — TELEPHONE (OUTPATIENT)
Dept: NEUROLOGY | Facility: CLINIC | Age: 70
End: 2020-07-06

## 2020-07-06 NOTE — TELEPHONE ENCOUNTER
Son reports that patient is declining.  Has had 2 falls. ( hit his head a few weeks ago) They reported the falls to PCP.  He is not eating much at all.   He is down to 179 lbs. Son says he is not himself. He is sleeping a lot. Requests an appetite stimulant.    Scheduled patient for Video visit with GL on 7/17/20 at 8:40 am.    Assisted son to sign up for proxy access and advised to create a new PW for [employer] since they haven't used My[employer] in awhile.

## 2020-07-06 NOTE — TELEPHONE ENCOUNTER
----- Message from Arleen Harriosn sent at 7/6/2020 12:32 PM CDT -----  SWETHA Marin--- pt is returning your call.  He stated to  call on this #, he was in a meeting.  Call back # [phone] or [phone]

## 2020-07-07 DIAGNOSIS — G20.A1 PARKINSON'S DISEASE: ICD-10-CM

## 2020-07-07 DIAGNOSIS — R13.10 DYSPHAGIA, UNSPECIFIED TYPE: Primary | ICD-10-CM

## 2020-07-07 NOTE — PROGRESS NOTES
Also reporting trouble swallowing  Taking carbidopa/levodopa 25/100mg 1 tab PO BID  Amantadine 200mg PO BID  Apparently on low dose carbidopa/levodopa due to levodopa -induced dyskinesias.    Suggested until next appointment try carbidopa/levodopa 25/100mg 1 tab PO TID week 1, and next 1.5 tabs TID to improve gait and tremor    He knows to stop if he feels dizziness    Consider swallow study  Consider screening MRI for MSA given orofacial movements and steep decline (if it continues)

## 2020-07-07 NOTE — TELEPHONE ENCOUNTER
Son reports since January 30 lbs. Has no interest in eating. No early satiety. Reports that some depression may be at play. No dyskinesia.  Reports tremor ongoing all day.   He continues carbidopa/levodopa - no changes to this  Last DBS setting change 2 months ago. At first this helped tremor and gait but these both have deteriorated.  Son reported some FOG on turns and shuffling is exacerbated.  I reported he may need an antidepressant, appetite stimulant, or both.    Reports at times confusion, but no hallucinations  No dizziness on standing  Mild nausea at times    Currently taking carbidopa/levodopa 25/100mg - 1 tab AM 1 tabs PM

## 2020-07-09 RX ORDER — MEGESTROL ACETATE 40 MG/1
40 TABLET ORAL 2 TIMES DAILY WITH MEALS
Qty: 60 TABLET | Refills: 11 | Status: SHIPPED | OUTPATIENT
Start: 2020-07-09 | End: 2021-07-29

## 2020-07-09 NOTE — TELEPHONE ENCOUNTER
Keep the virtual visit as I don't know yet if i'll be able to be in-person.    I'll send in megace (for appetite).

## 2020-07-10 ENCOUNTER — TELEPHONE (OUTPATIENT)
Dept: NEUROLOGY | Facility: CLINIC | Age: 70
End: 2020-07-10

## 2020-07-10 NOTE — TELEPHONE ENCOUNTER
----- Message from SE Markham sent at 7/10/2020  2:51 PM CDT -----  Regarding: MBSS  Good Afternoon,     I apologize, I can't find the original message to respond to your last message, but the fax # to send the modified barium swallow study order to [facility] is [phone]. Pt would like his swallow study completed there.     Thank you

## 2020-07-16 ENCOUNTER — TELEPHONE (OUTPATIENT)
Dept: NEUROLOGY | Facility: CLINIC | Age: 70
End: 2020-07-16

## 2020-07-16 NOTE — TELEPHONE ENCOUNTER
----- Message from Floresita Florence sent at 7/16/2020  8:36 AM CDT -----  Contact: patient's son Marco    called to speak with the doctor concerning his appointment.    He would like a callback at [phone]    Thanks  KB

## 2020-07-16 NOTE — TELEPHONE ENCOUNTER
Called and spoke to Marco regarding his dad's appt with  on tomorrow morning.    Walked through steps for Jessica

## 2020-07-17 ENCOUNTER — TELEPHONE (OUTPATIENT)
Dept: NEUROLOGY | Facility: CLINIC | Age: 70
End: 2020-07-17

## 2020-09-16 ENCOUNTER — OFFICE VISIT (OUTPATIENT)
Dept: NEUROLOGY | Facility: CLINIC | Age: 70
End: 2020-09-16
Payer: MEDICARE

## 2020-09-16 DIAGNOSIS — T42.8X5A LEVODOPA-INDUCED DYSKINESIA: ICD-10-CM

## 2020-09-16 DIAGNOSIS — G20.A1 PARKINSON'S DISEASE: Primary | ICD-10-CM

## 2020-09-16 DIAGNOSIS — G24.01 LEVODOPA-INDUCED DYSKINESIA: ICD-10-CM

## 2020-09-16 DIAGNOSIS — R63.4 WEIGHT LOSS: ICD-10-CM

## 2020-09-16 PROCEDURE — 99214 PR OFFICE/OUTPT VISIT, EST, LEVL IV, 30-39 MIN: ICD-10-PCS | Mod: 95,,, | Performed by: PSYCHIATRY & NEUROLOGY

## 2020-09-16 PROCEDURE — 99214 OFFICE O/P EST MOD 30 MIN: CPT | Mod: 95,,, | Performed by: PSYCHIATRY & NEUROLOGY

## 2020-09-16 RX ORDER — SERTRALINE HYDROCHLORIDE 25 MG/1
25 TABLET, FILM COATED ORAL DAILY
Qty: 30 TABLET | Refills: 11 | Status: SHIPPED | OUTPATIENT
Start: 2020-09-16 | End: 2021-09-16

## 2020-09-16 NOTE — PROGRESS NOTES
Neurology Telemedicine Note     Name: Modesto Mariscal  MRN: [id number]   CSN: [id number]      Date: 09/16/2020    The patient location is: Home  The chief complaint leading to consultation is:   Visit type: Virtual visit with synchronous audio and video    TOTAL TIME SPENT: 16 min    Each patient to whom I provide medical services by telemedicine is:  (1) informed of the relationship between the physician and patient and the respective role of any other health care provider with respect to management of the patient; and (2) notified that they may decline to receive medical services by telemedicine and may withdraw from such care at any time.    History of Present Illness (HPI):   - persistent tremor, facial grimacing  - more falls, and recently broke his hip and was NOT able to do surgery, but not thinks it has healed - walking is galanced, but gait is more unstable  - memory is affected, but not much change overall  - some dip in mood, maybe more depressed and ornery      Nonmotor/Premotor ROS: as per HPI, and all other systems are negative    Past Medical History: The patient  has a past medical history of Diabetes mellitus, GERD (gastroesophageal reflux disease), Parkinson's disease (4/28/2016), and Presence of neurostimulator (10/21/2019).    Social History: The patient  reports that he has never smoked. He has never used smokeless tobacco. He reports that he does not drink alcohol or use drugs.    Family History: Their family history is not on file.    Allergies: Patient has no known allergies.     Meds:   Current Outpatient Medications on File Prior to Visit   Medication Sig Dispense Refill    acetaminophen-codeine 300-30mg (TYLENOL #3) 300-30 mg Tab       amantadine HCL (SYMMETREL) 100 mg capsule TAKE 1 CAPSULE (100 MG TOTAL) BY MOUTH 2 (TWO) TIMES DAILY. 180 capsule 2    carbidopa-levodopa  mg (SINEMET)  mg per tablet TAKE 2 TABLETS AT 6:30 AM, 11AM, 3:30 PM. TAKE 1 TABLET AT 7PM. 630  tablet 3    ELIQUIS 5 mg Tab 5 mg 2 (two) times daily.       fluocinolone (SYNALAR) 0.01 % external solution Apply topically twice daily to ears      gabapentin (NEURONTIN) 300 MG capsule Take 300 mg by mouth 2 (two) times daily.   11    glipiZIDE (GLUCOTROL) 10 MG tablet Take 10 mg by mouth once daily.      ketoconazole (NIZORAL) 2 % shampoo   1    lidocaine-prilocaine (EMLA) cream Apply topically.      megestroL (MEGACE) 40 MG Tab Take 1 tablet (40 mg total) by mouth 2 (two) times daily with meals. 60 tablet 11    metformin (GLUCOPHAGE) 1000 MG tablet Take 1,000 mg by mouth 2 (two) times daily with meals.       ofloxacin (OCUFLOX) 0.3 % ophthalmic solution   1    olmesartan-hydrochlorothiazide (BENICAR HCT) 40-12.5 mg Tab 1 tablet once daily.   11    omeprazole (PRILOSEC) 20 MG capsule Take 20 mg by mouth once daily.       oxyCODONE-acetaminophen (PERCOCET) 5-325 mg per tablet Take 1 tablet by mouth every 4 (four) hours as needed for Pain. (Patient not taking: Reported on 10/21/2019) 10 tablet 0    prednisoLONE acetate (PRED FORTE) 1 % DrpS   1    triamcinolone acetonide 0.1% (KENALOG) 0.1 % Lotn apply twice daily to rash on legs as needed       No current facility-administered medications on file prior to visit.        Exam:  Vital Signs deferred with home visit    Constitutional  Well-developed, well-nourished, appears stated age   Eyes  No scleral icterus   ENT  Moist oral mucosa   Cardiovascular  No lower extremity edema    * Coord/Movemt/Gait ++ hypophonic speech.  + GRImace plus facial masking.  mod B bradykinesia.  No tremor seen today on exam with rest, posture, kinesis, or intention.   No chorea, athetosis, dystonia, myoclonus, or tics.   No motor impersistence.  Gait is deferred for safety.       Medical Record Review:  - Lab Results:  No visits with results within 3 Month(s) from this visit.   Latest known visit with results is:   Admission on 07/09/2019, Discharged on 07/09/2019    Component Date Value Ref Range Status    Prothrombin Time 2019 10.5  9.0 - 12.5 sec Final    INR 2019 1.0  0.8 - 1.2 Final    POCT Glucose 2019 120* 70 - 110 mg/dL Final    POCT Glucose 2019 107  70 - 110 mg/dL Final       Diagnoses:   1) PDism  2) Mem loss  3) Incontinence    Medical Decision Makin) adding zoloft 25 daily for depression/anxiety, see if encourages weight gain  2) urology consult if persistent incontinence, will better utilize the bedside urinal            Brad Goff MD, MPH  Division of Movement and Memory Disorders  [facility]  [phone]

## 2020-10-14 ENCOUNTER — PATIENT MESSAGE (OUTPATIENT)
Dept: NEUROLOGY | Facility: CLINIC | Age: 70
End: 2020-10-14

## 2020-10-19 ENCOUNTER — TELEPHONE (OUTPATIENT)
Dept: NEUROLOGY | Facility: CLINIC | Age: 70
End: 2020-10-19

## 2020-10-19 NOTE — TELEPHONE ENCOUNTER
----- Message from Meme Bragg sent at 10/19/2020 11:47 AM CDT -----  Regarding: pt advice  Contact: Marilou (dctr) @ [phone]  Caller asking to speak with someone in Dr. Goff's office regarding getting a list of all of patient's medications sent to patient's pharmacy [facility] @ [phone]

## 2020-12-04 ENCOUNTER — PATIENT MESSAGE (OUTPATIENT)
Dept: NEUROLOGY | Facility: CLINIC | Age: 70
End: 2020-12-04

## 2020-12-08 ENCOUNTER — PATIENT MESSAGE (OUTPATIENT)
Dept: NEUROLOGY | Facility: CLINIC | Age: 70
End: 2020-12-08

## 2020-12-16 ENCOUNTER — PATIENT MESSAGE (OUTPATIENT)
Dept: NEUROLOGY | Facility: CLINIC | Age: 70
End: 2020-12-16

## 2020-12-16 ENCOUNTER — OFFICE VISIT (OUTPATIENT)
Dept: NEUROLOGY | Facility: CLINIC | Age: 70
End: 2020-12-16
Payer: MEDICARE

## 2020-12-16 DIAGNOSIS — G20.A1 PARKINSON'S DISEASE: Primary | ICD-10-CM

## 2020-12-16 PROCEDURE — 99214 OFFICE O/P EST MOD 30 MIN: CPT | Mod: 95,,, | Performed by: PSYCHIATRY & NEUROLOGY

## 2020-12-16 PROCEDURE — 99214 PR OFFICE/OUTPT VISIT, EST, LEVL IV, 30-39 MIN: ICD-10-PCS | Mod: 95,,, | Performed by: PSYCHIATRY & NEUROLOGY

## 2020-12-16 RX ORDER — DONEPEZIL HYDROCHLORIDE 5 MG/1
5 TABLET, FILM COATED ORAL NIGHTLY
Qty: 30 TABLET | Refills: 11 | Status: SHIPPED | OUTPATIENT
Start: 2020-12-16 | End: 2021-12-17

## 2020-12-16 NOTE — PROGRESS NOTES
"Neurology Telemedicine Note     Name: Modesto Mariscal  MRN: [id number]   CSN: [id number]      Date: 12/16/2020    The patient location is: Home  The chief complaint leading to consultation is:   Visit type: Virtual visit with synchronous audio and video    TOTAL TIME SPENT: 16 min    Each patient to whom I provide medical services by telemedicine is:  (1) informed of the relationship between the physician and patient and the respective role of any other health care provider with respect to management of the patient; and (2) notified that they may decline to receive medical services by telemedicine and may withdraw from such care at any time.    History of Present Illness (HPI):   - "pretty good"  - had to back him off his Zoloft a little to avoid side effects - too spacy - they do think he is doing well on the 1/4 dose   - sleeping ok at night, waking up refreshed, still takes a couple of naps  - feels his mobility is very difficult, having full time help  - communicating pretty well, PT/OT not doing much for him  - medication consistency seems to help --> much better overall  - appetite is OK, but they think he is holding his own    From Sept 2020:  - persistent tremor, facial grimacing  - more falls, and recently broke his hip and was NOT able to do surgery, but not thinks it has healed - walking is galanced, but gait is more unstable  - memory is affected, but not much change overall  - some dip in mood, maybe more depressed and ornery      Nonmotor/Premotor ROS: as per HPI, and all other systems are negative    Past Medical History: The patient  has a past medical history of Diabetes mellitus, GERD (gastroesophageal reflux disease), Parkinson's disease (4/28/2016), and Presence of neurostimulator (10/21/2019).    Social History: The patient  reports that he has never smoked. He has never used smokeless tobacco. He reports that he does not drink alcohol or use drugs.    Family History: Their family history is " not on file.    Allergies: Patient has no known allergies.     Meds:   Current Outpatient Medications on File Prior to Visit   Medication Sig Dispense Refill    acetaminophen-codeine 300-30mg (TYLENOL #3) 300-30 mg Tab       amantadine HCL (SYMMETREL) 100 mg capsule TAKE 1 CAPSULE (100 MG TOTAL) BY MOUTH 2 (TWO) TIMES DAILY. 180 capsule 2    carbidopa-levodopa  mg (SINEMET)  mg per tablet TAKE 2 TABLETS AT 6:30 AM, 11AM, 3:30 PM. TAKE 1 TABLET AT 7PM. 630 tablet 3    ELIQUIS 5 mg Tab 5 mg 2 (two) times daily.       fluocinolone (SYNALAR) 0.01 % external solution Apply topically twice daily to ears      gabapentin (NEURONTIN) 300 MG capsule Take 300 mg by mouth 2 (two) times daily.   11    glipiZIDE (GLUCOTROL) 10 MG tablet Take 10 mg by mouth once daily.      ketoconazole (NIZORAL) 2 % shampoo   1    lidocaine-prilocaine (EMLA) cream Apply topically.      megestroL (MEGACE) 40 MG Tab Take 1 tablet (40 mg total) by mouth 2 (two) times daily with meals. 60 tablet 11    metformin (GLUCOPHAGE) 1000 MG tablet Take 1,000 mg by mouth 2 (two) times daily with meals.       ofloxacin (OCUFLOX) 0.3 % ophthalmic solution   1    olmesartan-hydrochlorothiazide (BENICAR HCT) 40-12.5 mg Tab 1 tablet once daily.   11    omeprazole (PRILOSEC) 20 MG capsule Take 20 mg by mouth once daily.       oxyCODONE-acetaminophen (PERCOCET) 5-325 mg per tablet Take 1 tablet by mouth every 4 (four) hours as needed for Pain. (Patient not taking: Reported on 10/21/2019) 10 tablet 0    prednisoLONE acetate (PRED FORTE) 1 % DrpS   1    sertraline (ZOLOFT) 25 MG tablet Take 1 tablet (25 mg total) by mouth once daily. 30 tablet 11    triamcinolone acetonide 0.1% (KENALOG) 0.1 % Lotn apply twice daily to rash on legs as needed       No current facility-administered medications on file prior to visit.        Exam:  Vital Signs deferred with home visit    Constitutional  Well-developed, well-nourished, appears stated age    Eyes  No scleral icterus   ENT  Moist oral mucosa   Cardiovascular  No lower extremity edema    * Coord/Movemt/Gait + hypophonic speech, stable  + Shemar Elizondo, + Louie Jimenez, + Adele Jeffery, Arianne Villalba  WORLD - DLORW  + Grimace plus facial masking.  mod B bradykinesia.  No tremor seen today on exam with rest, posture, kinesis, or intention.   No chorea, athetosis, dystonia, myoclonus, or tics.   No motor impersistence.  Gait is deferred for safety.       Medical Record Review:  - Lab Results:  No visits with results within 3 Month(s) from this visit.   Latest known visit with results is:   Admission on 2019, Discharged on 2019   Component Date Value Ref Range Status    Prothrombin Time 2019 10.5  9.0 - 12.5 sec Final    INR 2019 1.0  0.8 - 1.2 Final    POCT Glucose 2019 120* 70 - 110 mg/dL Final    POCT Glucose 2019 107  70 - 110 mg/dL Final       Diagnoses:   1) PDism  2) Mem loss  3) Incontinence    Medical Decision Makin) adding donepezil 5 mg at bedtime now for gait and thinking  2) continue 1/4 zoloft 25 daily for depression/anxiety  3) keep other meds the same  4) COVID19 precautions - hand washing, sanitizing home/deliveries, physical distancing, avoiding large groups, wearing mask in public            rBad Goff MD, MPH  Division of Movement and Memory Disorders  [facility]  [phone]

## 2021-04-13 ENCOUNTER — TELEPHONE (OUTPATIENT)
Dept: NEUROLOGY | Facility: CLINIC | Age: 71
End: 2021-04-13

## 2022-05-09 NOTE — ASSESSMENT & PLAN NOTE
IPG interrogated and programmed over 30 min today.  Trained patient, wife and daughter on how to change channels.      END settings:  LEFT Target Group contacts V/ mA PW RATE  RIGHT Target Group contacts V/ mA PW RATE NOTES    STN A 2-, c+ 3.0v 90 100   STN1 A 1-,2-,3+ 3.0v 150 100 ACTIVE            STN2  0+,1- 3.0 150 100 ACTIVE     B 2-, C+ 2.6v 90 180    B 1+, 2-, 3- 3.0v 90 180      C 2-, C+ 2.6v 90 120   STN1 C 0-,1-, C+ 0.2v 60 120 Best gait            STN2 C 2-,3-, C+ 0.5v 60 120      D 2-,C+ 2.6v 90 120    D OFF    NEW        Consent: Written consent obtained. Risks include but not limited to lid/brow ptosis, bruising, swelling, diplopia, temporary effect, incomplete chemical denervation.

## 2022-08-02 ENCOUNTER — TELEPHONE (OUTPATIENT)
Dept: NEUROLOGY | Facility: CLINIC | Age: 72
End: 2022-08-02
Payer: MEDICARE

## 2022-08-02 NOTE — TELEPHONE ENCOUNTER
----- Message from Haily Betts sent at 8/2/2022  2:15 PM CDT -----  Contact: Patient  Type:   Appointment Request Virtual      Name of Caller:Patient son  When is the first available appointment?Virtual appt patient will be in [facility]   Symptoms:follow up  Would the patient rather a call back or a response via [facility]? Call back   Best Call Back Number:[phone]  Additional Information: Please assist

## 2022-10-19 NOTE — TELEPHONE ENCOUNTER
Spoke with pt's daughter Marilou yesterday. Pt is requesting a virtual visit with Dr. Shaikh and I informed family that Dr. Shaikh's license to practice in [place] is still pending but should be approved soon. Pt's daughter stated that her father has not had his DBS battery changed since 2019 (after looking at records I verified that it was last changed 07/2019). I let pt's daughter know I would be reaching out to Dr. Shaikh for advice on next best steps.

## 2022-12-16 ENCOUNTER — PATIENT MESSAGE (OUTPATIENT)
Dept: NEUROLOGY | Facility: CLINIC | Age: 72
End: 2022-12-16
Payer: MEDICARE

## 2023-02-01 NOTE — LETTER
76-year-old status post robotic assisted bilateral salpingo-oophorectomy, vaginotomy for specimen removal   Final pathology was benign  She has seen genetic counseling as of 1/13/2023  She also has a history of lung carcinoid and is getting follow-up with thoracic surgery  She has recovered well from surgery  Her performance status is 0   1   I discussed ongoing activity limitations  2  She was encouraged to call the office with any additional questions or concerns  October 16, 2018      Cuong Mock III, MD  [address] [facility] [place] [address]           [facility]  [address]  [facility] [address]  Phone: [phone]  Fax: [phone]          Patient: Modesto Mariscal   MR Number: [id number]   YOB: 1950   Date of Visit: 10/9/2018       Dear Dr. Cuong Mock III:    Thank you for referring Modesto Mariscal to me for evaluation. Attached you will find relevant portions of my assessment and plan of care.    If you have questions, please do not hesitate to call me. I look forward to following Modesto Mariscal along with you.    Sincerely,    Brad Goff MD    Enclosure  CC:  No Recipients    If you would like to receive this communication electronically, please contact [email or web address] or [phone] to request more information on [employer] access.    For providers and/or their staff who would like to refer a patient to [facility], please contact us through our one-stop-shop provider referral line, [facility], at [phone].    If you feel you have received this communication in error or would no longer like to receive these types of communications, please e-mail [email or web address]

## 2023-03-10 ENCOUNTER — DOCUMENTATION ONLY (OUTPATIENT)
Dept: NEUROLOGY | Facility: CLINIC | Age: 73
End: 2023-03-10
Payer: MEDICARE

## 2023-03-10 ENCOUNTER — TELEPHONE (OUTPATIENT)
Dept: NEUROLOGY | Facility: CLINIC | Age: 73
End: 2023-03-10
Payer: MEDICARE

## 2023-03-10 NOTE — TELEPHONE ENCOUNTER
----- Message from Ade Calles sent at 3/10/2023  8:05 AM CST -----  Pt  son  Marco would like to be called back regarding pace maker  battery has  and Pt not doing well , don't know what to do      can be reached at  [phone]

## 2023-03-10 NOTE — PROGRESS NOTES
Called pt on 2 phones, 4x, left VM  Suggested if his DBS battery is confirmed as , and PD symptoms are rapidly worse please visit nearest ER for assessment.

## 2023-03-21 ENCOUNTER — TELEPHONE (OUTPATIENT)
Dept: NEUROLOGY | Facility: CLINIC | Age: 73
End: 2023-03-21
Payer: MEDICARE

## 2023-03-21 NOTE — TELEPHONE ENCOUNTER
----- Message from Macey Duffy sent at 3/21/2023  8:11 AM CDT -----  Contact: Kayleen @ [phone]  Dr. Harrison office needs a copy of the pt's orders for the  Parkinson's Disease/DBS Surgery Consult. She needs it faxed to the office at [phone]. If the pt needs labs, please let them know what he needs because they can get a home health nurse to the pt home to get it done.

## 2023-03-21 NOTE — TELEPHONE ENCOUNTER
Spoke to Kayleen and they have received all information needed for clearance. Nothing else needed on our end.

## 2023-03-27 ENCOUNTER — TELEPHONE (OUTPATIENT)
Dept: NEUROLOGY | Facility: CLINIC | Age: 73
End: 2023-03-27
Payer: MEDICARE

## 2023-03-27 ENCOUNTER — OFFICE VISIT (OUTPATIENT)
Dept: NEUROLOGY | Facility: CLINIC | Age: 73
End: 2023-03-27
Payer: MEDICARE

## 2023-03-27 ENCOUNTER — TELEPHONE (OUTPATIENT)
Dept: NEUROSURGERY | Facility: CLINIC | Age: 73
End: 2023-03-27
Payer: MEDICARE

## 2023-03-27 DIAGNOSIS — G20.A1 PARKINSON'S DISEASE: Primary | ICD-10-CM

## 2023-03-27 DIAGNOSIS — G20.A1 PARKINSON'S DISEASE: ICD-10-CM

## 2023-03-27 PROCEDURE — 99215 PR OFFICE/OUTPT VISIT, EST, LEVL V, 40-54 MIN: ICD-10-PCS | Mod: GV,95,, | Performed by: PSYCHIATRY & NEUROLOGY

## 2023-03-27 PROCEDURE — 99215 OFFICE O/P EST HI 40 MIN: CPT | Mod: GV,95,, | Performed by: PSYCHIATRY & NEUROLOGY

## 2023-03-27 NOTE — TELEPHONE ENCOUNTER
Called pt's daughter to help them connect to vv. They were stuck on the hardware test, so I helped them get into the visit successfully.

## 2023-03-27 NOTE — TELEPHONE ENCOUNTER
Called pt to confirm vv with Dr. Marin today at 2:40 PM. Spoke with pt's daughter, who said that they may have had an account set up but didn't recall a username or password. She said she hadn't had the chance to set everything up since she was currently in the ER. I gave the pt's daughter the number for technical support since she was unsure if she had set up an account for the pt or not.

## 2023-03-27 NOTE — PROGRESS NOTES
"Neurology Telemedicine Note  The patient location is: HOME  The chief complaint leading to visit is: iPD  1. Parkinson's disease          Visit type: Virtual visit with synchronous audio and video  Total time spent with patient: 40  Each patient to whom he or she provides medical services by telemedicine is:  (1) informed of the relationship between the physician and patient and the respective role of any other health care provider with respect to management of the patient; and (2) notified that he or she may decline to receive medical services by telemedicine and may withdraw from such care at any time.       Name: Modesto Mariscal  MRN: [id number]   CSN: [id number]      Date: 04/03/2023    The patient location is: Home  The chief complaint leading to consultation is:   Visit type: Virtual visit with synchronous audio and video    72 y.o. man with iPD s/p b/l STN DBS coming for DBS eval. Followed by Dr. Goff. Currently on hospice.    Per daughter, 2-3 weeks ago patient took a trazodone and after he awoke, he has bilateral inability to move arms or legs.  Prior he could feed self,  a drink off th table. Bedbound at baseline.  Motor skills slowly returned to this baseline over 1-2 weeks. No fevers or overt infection. No arlyn numbness or weakness noted.  Daughter interrogated DBS device that says  "Call doctor EOS"    Continues  Carbidopa/levodopa 25/100mg 2 tab PO BID  -has peak dose orofacial dyskinesias  Amantadine 200mg BID 8am/8pm  Notable insomnia    ----------------------------------------------------    History of Present Illness (HPI):   - "pretty good"  - had to back him off his Zoloft a little to avoid side effects - too spacy - they do think he is doing well on the 1/4 dose   - sleeping ok at night, waking up refreshed, still takes a couple of naps  - feels his mobility is very difficult, having full time help  - communicating pretty well, PT/OT not doing much for him  - medication consistency " seems to help --> much better overall  - appetite is OK, but they think he is holding his own    From Sept 2020:  - persistent tremor, facial grimacing  - more falls, and recently broke his hip and was NOT able to do surgery, but not thinks it has healed - walking is galanced, but gait is more unstable  - memory is affected, but not much change overall  - some dip in mood, maybe more depressed and ornery      Nonmotor/Premotor ROS: as per HPI, and all other systems are negative    Past Medical History: The patient  has a past medical history of Diabetes mellitus, GERD (gastroesophageal reflux disease), Parkinson's disease (4/28/2016), and Presence of neurostimulator (10/21/2019).    Social History: The patient  reports that he has never smoked. He has never used smokeless tobacco. He reports that he does not drink alcohol and does not use drugs.    Family History: Their family history is not on file.    Allergies: Patient has no known allergies.     Meds:   Current Outpatient Medications on File Prior to Visit   Medication Sig Dispense Refill    acetaminophen-codeine 300-30mg (TYLENOL #3) 300-30 mg Tab       amantadine HCL (SYMMETREL) 100 mg capsule TAKE 1 CAPSULE (100 MG TOTAL) BY MOUTH 2 (TWO) TIMES DAILY. 180 capsule 3    carbidopa-levodopa  mg (SINEMET)  mg per tablet TAKE 2 TABLETS AT 6:30 AM, 11AM, 3:30 PM. TAKE 1 TABLET AT 7PM. 630 tablet 3    donepeziL (ARICEPT) 5 MG tablet TAKE 1 TABLET (5 MG TOTAL) BY MOUTH EVERY EVENING. 30 tablet 11    ELIQUIS 5 mg Tab 5 mg 2 (two) times daily.       fluocinolone (SYNALAR) 0.01 % external solution Apply topically twice daily to ears      gabapentin (NEURONTIN) 300 MG capsule Take 300 mg by mouth 2 (two) times daily.   11    glipiZIDE (GLUCOTROL) 10 MG tablet Take 10 mg by mouth once daily.      ketoconazole (NIZORAL) 2 % shampoo   1    lidocaine-prilocaine (EMLA) cream Apply topically.      megestroL (MEGACE) 40 MG Tab TAKE 1 TABLET (40 MG TOTAL) BY MOUTH 2  (TWO) TIMES DAILY WITH MEALS. 180 tablet 0    metformin (GLUCOPHAGE) 1000 MG tablet Take 1,000 mg by mouth 2 (two) times daily with meals.       ofloxacin (OCUFLOX) 0.3 % ophthalmic solution   1    olmesartan-hydrochlorothiazide (BENICAR HCT) 40-12.5 mg Tab 1 tablet once daily.   11    omeprazole (PRILOSEC) 20 MG capsule Take 20 mg by mouth once daily.       oxyCODONE-acetaminophen (PERCOCET) 5-325 mg per tablet Take 1 tablet by mouth every 4 (four) hours as needed for Pain. (Patient not taking: Reported on 10/21/2019) 10 tablet 0    prednisoLONE acetate (PRED FORTE) 1 % DrpS   1    sertraline (ZOLOFT) 25 MG tablet Take 1 tablet (25 mg total) by mouth once daily. 30 tablet 11    triamcinolone acetonide 0.1% (KENALOG) 0.1 % Lotn apply twice daily to rash on legs as needed       No current facility-administered medications on file prior to visit.       Exam:  Vital Signs deferred with home visit    Constitutional  Well-developed, well-nourished, appears stated age   Eyes  No scleral icterus   ENT  Moist oral mucosa   Cardiovascular  No lower extremity edema    * Coord/Movemt/Gait + mod hypophonic speech, stable  + Shemar Elizondo, + Louie Jimenez, + Adele Jeffery, Arianne Villalba  WORLD - DLORW  + Grimace plus facial masking.  mod B bradykinesia.    No chorea, athetosis, dystonia, myoclonus, or tics.   No motor impersistence.  Gait is deferred.    Finger taps 3+ LUE, flicks 3+  Moderate 2+ pillrolling  tremor LUE    R hand 1+ taps       Medical Record Review:  - Lab Results:  No visits with results within 3 Month(s) from this visit.   Latest known visit with results is:   Admission on 07/09/2019, Discharged on 07/09/2019   Component Date Value Ref Range Status    Prothrombin Time 07/09/2019 10.5  9.0 - 12.5 sec Final    INR 07/09/2019 1.0  0.8 - 1.2 Final    POCT Glucose 07/09/2019 120 (H)  70 - 110 mg/dL Final    POCT Glucose 07/09/2019 107  70 - 110 mg/dL Final       Problem List Items Addressed This Visit          Neuro     Parkinson's disease    Overview     Right tremor 2013; s/p bilateral STN DBS 2016           Current Assessment & Plan     iPD S/P B/L STN DBS  DBS is EOS  Reported to daughter and patient that his neurostimulator was no longer delivering therapy.  She will meet with Son to family discuss risks and benefits of IPG replacement. \    Can continue  Carbidopa/levodopa 25/100mg 2 tab PO BID  -has peak dose orofacial dyskinesias  May change Amantadine 200mg BID 8am/8pm ->8am/4pm to prevent insomnia                Casie Marin MD, MS  [facility]  Department of Neurology  Movement Disorders

## 2023-03-27 NOTE — ASSESSMENT & PLAN NOTE
iPD S/P B/L STN DBS  DBS is EOS  Reported to daughter and patient that his neurostimulator was no longer delivering therapy.  She will meet with Son to family discuss risks and benefits of IPG replacement. \    Can continue  Carbidopa/levodopa 25/100mg 2 tab PO BID  -has peak dose orofacial dyskinesias  May change Amantadine 200mg BID 8am/8pm ->8am/4pm to prevent insomnia

## 2023-03-27 NOTE — PROGRESS NOTES
"Neurology Telemedicine Note  The patient location is: HOME  The chief complaint leading to visit is: iPD  No diagnosis found.  Visit type: Virtual visit with synchronous audio and video  Total time spent with patient: 40  Each patient to whom he or she provides medical services by telemedicine is:  (1) informed of the relationship between the physician and patient and the respective role of any other health care provider with respect to management of the patient; and (2) notified that he or she may decline to receive medical services by telemedicine and may withdraw from such care at any time.       Name: Modesto Mariscal  MRN: [id number]   [facility]: [id number]      Date: 03/27/2023    The patient location is: Home  The chief complaint leading to consultation is:   Visit type: Virtual visit with synchronous audio and video    Per daughter, 2-3 weeks ago patient took a trazodone and after he awoke, he has bilateral inability to move arms or legs.  Could feed self,  a drink prior Sudden onset   This slowly returned over 1 week  Daughter interrogated DBS device that says  "Call doctor EOS" since 1 month    Continues  Carbidopa/levodopa 25/100mg 2 tab PO BID  -has peak dose orofacial dyskinesias  Amantadine 200mg BID 8am/8pm    ----------------------------------------------------    History of Present Illness (HPI):   - "pretty good"  - had to back him off his Zoloft a little to avoid side effects - too spacy - they do think he is doing well on the 1/4 dose   - sleeping ok at night, waking up refreshed, still takes a couple of naps  - feels his mobility is very difficult, having full time help  - communicating pretty well, PT/OT not doing much for him  - medication consistency seems to help --> much better overall  - appetite is OK, but they think he is holding his own    From Sept 2020:  - persistent tremor, facial grimacing  - more falls, and recently broke his hip and was NOT able to do surgery, but not thinks it " has healed - walking is galanced, but gait is more unstable  - memory is affected, but not much change overall  - some dip in mood, maybe more depressed and ornery      Nonmotor/Premotor ROS: as per HPI, and all other systems are negative    Past Medical History: The patient  has a past medical history of Diabetes mellitus, GERD (gastroesophageal reflux disease), Parkinson's disease (4/28/2016), and Presence of neurostimulator (10/21/2019).    Social History: The patient  reports that he has never smoked. He has never used smokeless tobacco. He reports that he does not drink alcohol and does not use drugs.    Family History: Their family history is not on file.    Allergies: Patient has no known allergies.     Meds:   Current Outpatient Medications on File Prior to Visit   Medication Sig Dispense Refill    acetaminophen-codeine 300-30mg (TYLENOL #3) 300-30 mg Tab       amantadine HCL (SYMMETREL) 100 mg capsule TAKE 1 CAPSULE (100 MG TOTAL) BY MOUTH 2 (TWO) TIMES DAILY. 180 capsule 3    carbidopa-levodopa  mg (SINEMET)  mg per tablet TAKE 2 TABLETS AT 6:30 AM, 11AM, 3:30 PM. TAKE 1 TABLET AT 7PM. 630 tablet 3    donepeziL (ARICEPT) 5 MG tablet TAKE 1 TABLET (5 MG TOTAL) BY MOUTH EVERY EVENING. 30 tablet 11    ELIQUIS 5 mg Tab 5 mg 2 (two) times daily.       fluocinolone (SYNALAR) 0.01 % external solution Apply topically twice daily to ears      gabapentin (NEURONTIN) 300 MG capsule Take 300 mg by mouth 2 (two) times daily.   11    glipiZIDE (GLUCOTROL) 10 MG tablet Take 10 mg by mouth once daily.      ketoconazole (NIZORAL) 2 % shampoo   1    lidocaine-prilocaine (EMLA) cream Apply topically.      megestroL (MEGACE) 40 MG Tab TAKE 1 TABLET (40 MG TOTAL) BY MOUTH 2 (TWO) TIMES DAILY WITH MEALS. 180 tablet 0    metformin (GLUCOPHAGE) 1000 MG tablet Take 1,000 mg by mouth 2 (two) times daily with meals.       ofloxacin (OCUFLOX) 0.3 % ophthalmic solution   1    olmesartan-hydrochlorothiazide (BENICAR HCT)  40-12.5 mg Tab 1 tablet once daily.   11    omeprazole (PRILOSEC) 20 MG capsule Take 20 mg by mouth once daily.       oxyCODONE-acetaminophen (PERCOCET) 5-325 mg per tablet Take 1 tablet by mouth every 4 (four) hours as needed for Pain. (Patient not taking: Reported on 10/21/2019) 10 tablet 0    prednisoLONE acetate (PRED FORTE) 1 % DrpS   1    sertraline (ZOLOFT) 25 MG tablet Take 1 tablet (25 mg total) by mouth once daily. 30 tablet 11    triamcinolone acetonide 0.1% (KENALOG) 0.1 % Lotn apply twice daily to rash on legs as needed       No current facility-administered medications on file prior to visit.       Exam:  Vital Signs deferred with home visit    Constitutional  Well-developed, well-nourished, appears stated age   Eyes  No scleral icterus   ENT  Moist oral mucosa   Cardiovascular  No lower extremity edema    * Coord/Movemt/Gait + mod hypophonic speech, stable  + Shemar Elizondo, + Louie Jimenez, + Adele Jeffery, Arianne Villalba  WORLD - [facility]  + Grimace plus facial masking.  mod B bradykinesia.    No chorea, athetosis, dystonia, myoclonus, or tics.   No motor impersistence.  Gait is deferred for safety.    Finger taps 3+ LUE, flicks 3+  Moderate 2+ tremor    R hand 1+ taps       Medical Record Review:  - Lab Results:  No visits with results within 3 Month(s) from this visit.   Latest known visit with results is:   Admission on 2019, Discharged on 2019   Component Date Value Ref Range Status    Prothrombin Time 2019 10.5  9.0 - 12.5 sec Final    INR 2019 1.0  0.8 - 1.2 Final    POCT Glucose 2019 120 (H)  70 - 110 mg/dL Final    POCT Glucose 2019 107  70 - 110 mg/dL Final       Diagnoses:   1) PDism  2) Mem loss  3) Incontinence    Medical Decision Makin) adding donepezil 5 mg at bedtime now for gait and thinking  2) continue 1/4 zoloft 25 daily for depression/anxiety  3) keep other meds the same  4) COVID19 precautions - hand washing, sanitizing home/deliveries, physical  distancing, avoiding large groups, wearing mask in public            Brad Goff MD, MPH  Division of Movement and Memory Disorders  [facility]  [phone]    Carbidopa/levodopa 25/100mg 2 tab PO BID  -has peak dose orofacial dyskinesias  Amantadine 200mg BID 8am/8pm ->8am/4pm to prevent insomnia    DBS change  Off hospice

## 2023-04-03 ENCOUNTER — TELEPHONE (OUTPATIENT)
Dept: NEUROSURGERY | Facility: CLINIC | Age: 73
End: 2023-04-03
Payer: MEDICARE

## 2023-04-03 ENCOUNTER — TELEPHONE (OUTPATIENT)
Dept: NEUROLOGY | Facility: CLINIC | Age: 73
End: 2023-04-03
Payer: MEDICARE

## 2023-04-03 NOTE — TELEPHONE ENCOUNTER
"Spoke to Pt daughter (Marilou) regarding s/s of concern.     States Sinemet is causing "really bad" tardive dyskinesia symptoms. Reports:  - Tongue is so dry it is now swollen and protruding, affecting ability to eat and drink   - requires skipping either morning or evening dose of Sinemet to allow one meal (denies any change in motor activity with this)  - moved evening amantadine 200 mg dose to 4pm to try to alleviate symptoms in time for meals without success    DBS battery replacement r/s to 4/18.     Requesting any advice or medication to help with symptoms as they await DBS procedure.   "

## 2023-04-03 NOTE — TELEPHONE ENCOUNTER
----- Message from Gallo Bustamante sent at 4/3/2023  3:01 PM CDT -----  Regarding: Advice  Contact: @[phone]  Patients daughter is calling to get advisement for carbidopa-levodopa  mg (SINEMET)  mg per tablet (causes tongue swelling) & amantadine HCL (SYMMETREL) 100 mg capsule ( supposed to combat tongue swelling and it doesn't). Please call and advise @[phone]

## 2023-04-18 ENCOUNTER — TELEPHONE (OUTPATIENT)
Dept: NEUROSURGERY | Facility: CLINIC | Age: 73
End: 2023-04-18
Payer: MEDICARE

## 2023-04-18 NOTE — TELEPHONE ENCOUNTER
SW PTS DAUGHTER, PT IS NOT DOING WELL TODAY AND SURGERY HAS BEEN POSTPONED. IF PATIENT IMPROVES IN THE FUTURE, SURGERY TO CHANGE THE DBS GENERATOR CAN BE FURTHER DISCUSSED.

## 2023-04-18 NOTE — TELEPHONE ENCOUNTER
----- Message from Kayla Treadwell sent at 4/18/2023  1:05 PM CDT -----  Regarding: patient update  The patient daughter called returning a missed call, patient daughter Marilou states the patient had a real bad set back over the weekend and yesterday. States he is being monitored (hospice) she states just holding their breath to see what happens call back # [phone] (Marilou) daughter    No further information provided      Patient can be contacted @#   [phone] (Marilou)

## 2024-02-07 ENCOUNTER — PATIENT MESSAGE (OUTPATIENT)
Dept: RESEARCH | Facility: HOSPITAL | Age: 74
End: 2024-02-07
Payer: MEDICARE

## 2024-05-22 ENCOUNTER — PATIENT MESSAGE (OUTPATIENT)
Dept: NEUROLOGY | Facility: CLINIC | Age: 74
End: 2024-05-22
Payer: MEDICARE

## 2025-05-16 ENCOUNTER — PATIENT MESSAGE (OUTPATIENT)
Facility: CLINIC | Age: 75
End: 2025-05-16
Payer: MEDICARE

## (undated) DEVICE — SUT MONOCRYL 4-0 PS-1 UND

## (undated) DEVICE — SUT VICRYL PLUS 4-0 P3 18IN

## (undated) DEVICE — DRAPE STERI INSTRUMENT 1018

## (undated) DEVICE — SUT SILK 3-0 CR/RB-1 8-18

## (undated) DEVICE — SUT VICRYL PLUS 3-0 SH 18IN

## (undated) DEVICE — SEE MEDLINE ITEM 156905

## (undated) DEVICE — TAPE MEDIPORE 4IN X 2YDS

## (undated) DEVICE — TUBE FRAZIER 5MM 2FT SOFT TIP

## (undated) DEVICE — SPONGE DERMACEA GAUZE 4X4

## (undated) DEVICE — ADHESIVE MASTISOL VIAL 48/BX

## (undated) DEVICE — CLOSURE SKIN STERI STRIP 1/2X4

## (undated) DEVICE — DRAPE INCISE IOBAN 2 23X17IN

## (undated) DEVICE — DRAPE THYROID WITH ARMBOARD

## (undated) DEVICE — PROGRAMMER PTNT NEUROSTIM DBS

## (undated) DEVICE — DRESSING TELFA N ADH 3X8

## (undated) DEVICE — DRESSING TEGADERM 2X2 3/4

## (undated) DEVICE — GUIDE ENDOCAVITY NEEDLE 3.5X20

## (undated) DEVICE — SUT 2-0 12-18IN SILK